# Patient Record
Sex: FEMALE | Race: OTHER | Employment: FULL TIME | URBAN - METROPOLITAN AREA
[De-identification: names, ages, dates, MRNs, and addresses within clinical notes are randomized per-mention and may not be internally consistent; named-entity substitution may affect disease eponyms.]

---

## 2017-04-27 ENCOUNTER — OFFICE VISIT (OUTPATIENT)
Dept: FAMILY MEDICINE CLINIC | Age: 32
End: 2017-04-27

## 2017-04-27 VITALS
BODY MASS INDEX: 37.57 KG/M2 | SYSTOLIC BLOOD PRESSURE: 105 MMHG | DIASTOLIC BLOOD PRESSURE: 70 MMHG | HEIGHT: 61 IN | TEMPERATURE: 98.3 F | HEART RATE: 68 BPM | WEIGHT: 199 LBS

## 2017-04-27 DIAGNOSIS — N63.20 LEFT BREAST MASS: Primary | ICD-10-CM

## 2017-04-27 RX ORDER — NAPROXEN 500 MG/1
500 TABLET ORAL 2 TIMES DAILY WITH MEALS
Qty: 60 TAB | Refills: 1 | Status: SHIPPED | OUTPATIENT
Start: 2017-04-27 | End: 2017-08-11

## 2017-04-27 NOTE — PROGRESS NOTES
Subjective:     Chief Complaint   Patient presents with    Breast Mass     Left breast mass   X about 2 weeks        She  is a 34 y.o. female who presents for evaluation of L breast nodule x 3 weeks-1 month. Pt notes it will hurt when she does a lot of activity (works in [employer]) or rubs it. Pt denies any swelling, discharge, redness nor skin changes. No S&S in R breast.       PMH:  Denies     Surg:  x 2, breast implants () and hernia repair     NKDA,     No current Rx, no current form of BC nor injections     Family Hx of CA    Social: [place] [facility] [place], works in [employer] and has 2 children and is . Pt denies etoh, tobacco nor other drug. Notes family Hx of CA: grandparents-liver and uterine, maternal cousin diagnosed at 27 yrs old with breast CA    ROS  Gen - no fever/chills  Resp - no dyspnea or cough  CV - no chest pain or GARRISON  Rest per HPI    No past medical history on file. No past surgical history on file. No current outpatient prescriptions on file prior to visit. No current facility-administered medications on file prior to visit.          Objective:     Vitals:    17 1346   BP: 105/70   Pulse: 68   Temp: 98.3 °F (36.8 °C)   TempSrc: Oral   Weight: 199 lb (90.3 kg)   Height: 5' 1.22\" (1.555 m)       Pt declined chaperone    Physical Examination:  General appearance - alert, well appearing, and in no distress  Eyes -sclera anicteric  Neck - supple, no significant adenopathy, no thyromegaly  Chest - clear to auscultation, no wheezes, rales or rhonchi, symmetric air entry  Heart - normal rate, regular rhythm, normal S1, S2, no murmurs, rubs, clicks or gallops  Neurological - alert, oriented, no focal findings or movement disorder noted  L breast:  A palpable approx 2cm, well defined mass noted at approx 9:00 area on L medial breast  R breast WNL, no visible skin changes, no axillary lymphadenopathy        Assessment/ Plan:   Solomon was seen today for breast mass. Diagnoses and all orders for this visit:    Left breast mass    Other orders  -     naproxen (NAPROSYN) 500 mg tablet; Take 1 Tab by mouth two (2) times daily (with meals). PRN Naproxen for intermittent pain, likely M/S from her work. Will refer to [facility] for follow-up eval of palpable nodule, yamileth in the context of family Hx of materal breast CA (cousin). RTC PRN. I have discussed the diagnosis with the patient and the intended plan as seen in the above orders. The patient has received an after-visit summary and questions were answered concerning future plans. I have discussed medication side effects and warnings with the patient as well. The patient verbalizes understanding and agreement with the plan. Follow-up Disposition:  Return if symptoms worsen or fail to improve.

## 2017-04-27 NOTE — PATIENT INSTRUCTIONS
Bultos en los senos: Instrucciones de cuidado - [ Breast Lumps: Care Instructions ]  Instrucciones de cuidado  Los bultos en los senos son comunes, sobre todo en las mujeres de Siddhartha 27 y los 48 años de edad. Los senos de [place] se sienten llenos de bultos y están sensibles antes de villarreal periodo menstrual. Las mujeres también podrían tener bultos navid el período de lactancia. Los bultos en los senos podrían desaparecer después de la menopausia. Todos los bultos nuevos en los senos de las mujeres después de la menopausia deben ser examinados por un médico.  Aunque los bultos pueden ser normales para usted, es importante que villarreal médico revise cualquier bulto o engrosamiento distinto del bib del seno para asegurarse de que no sea cáncer. Un bulto puede ser [facility] wendy o distinto del bib del [facility]. La atención de seguimiento es sunshine parte clave de villarreal tratamiento y seguridad. Asegúrese de hacer y acudir a todas las citas, y llame a villarreal médico si está teniendo problemas. También es sunshine buena idea saber los resultados de los exámenes y mantener sunshine lista de los medicamentos que nata. ¿Cómo puede cuidarse en el hogar? · Noel Corcoran don para hacerse Lolly Yulia y para otras visitas de seguimiento según lo recomendado por villarreal médico.  ¿Cuándo debe pedir ayuda? Llame a villarreal médico ahora mismo o busque atención médica inmediata si:  · Tiene cambios nuevos en un seno, kristen:  ¨ Un bulto o engrosamiento en el seno o la axila. ¨ Un cambio en el tamaño o la forma del seno. ¨ Cambios en la piel, kristen hoyuelos o arrugas. ¨ Secreción de [employer]. ¨ Un cambio en el color o la textura de la piel del seno o la mich oscura alrededor del pezón (areola). ¨ Un cambio en la forma del pezón (podría parecer kristen si estuviera hundido en el seno). · Tiene síntomas de infección en el seno, tales kristen:  ¨ Aumento del dolor, la hinchazón, el enrojecimiento o la temperatura alrededor del seno.   ¨ Vetas rojizas que se extienden desde el seno. ¨ Pus que supura de un seno. Magdadaniel Hof. Preste especial atención a los cambios en villarreal matt y asegúrese de comunicarse con villarreal médico si:  · No mejora kristen se esperaba. ¿Dónde puede encontrar más información en inglés? Mariola Curly a http://yulia-radha.info/. Teresita Cumminsd I227 en la búsqueda para aprender más acerca de \"Bultos en los senos: Instrucciones de cuidado - [ Breast Lumps: Care Instructions ]. \"  Revisado: 13 octubre, 2016  Versión del contenido: 11.2  © 5019-3664 Healthwise, Incorporated. Las instrucciones de cuidado fueron adaptadas bajo licencia por Good Help Connections (which disclaims liability or warranty for this information). Si ted tiene [place] afección médica o sobre estas instrucciones, siempre pregunte a villarreal profesional de matt. Healthwise, Incorporated niega toda garantía o responsabilidad por villarreal uso de esta información.

## 2017-04-27 NOTE — PROGRESS NOTES
Printed AVS, provided to pt and reviewed. Pt indicated understanding and had no questions. Told pt that rx's have been sent to pharmacy and they should be ready for  in approximately 2 hrs. Reviewed Naproxen with the pt. Provided pt with information and phone # for [facility]. Explained that they will do a financial screening before scheduling appt. Miah Lindsey was the .  Brando Aguila RN

## 2017-05-11 ENCOUNTER — TELEPHONE (OUTPATIENT)
Dept: FAMILY MEDICINE CLINIC | Age: 32
End: 2017-05-11

## 2017-05-11 NOTE — TELEPHONE ENCOUNTER
Call made with the assistance of int. #[id number]. The pt did not answer home phone. A call was made to the pt's mobile phone. The pt answered. The pt was requesting the address of [facility] appt for tomorrow. She stated she had lost the paper. The only information noted in the chart was an appt day and time and provider with [facility]. There was not an address. The pt stated she knew it was with a facility in [place]. The pt was told there are several places in [place], so recommended she call [facility] and ask for the information. The pt stated she had lost the handout with the [facility] phone number. The pt was given [facility]'s phone number. She was told she should arrive 30 min early to the appt. The pt verbalized understanding and denied further questions.  Mandeep Colin RN

## 2017-05-12 ENCOUNTER — OFFICE VISIT (OUTPATIENT)
Dept: FAMILY PLANNING/WOMEN'S HEALTH CLINIC | Age: 32
End: 2017-05-12

## 2017-05-12 ENCOUNTER — HOSPITAL ENCOUNTER (OUTPATIENT)
Dept: ULTRASOUND IMAGING | Age: 32
Discharge: HOME | End: 2017-05-12
Attending: NURSE PRACTITIONER
Payer: SUBSIDIZED

## 2017-05-12 ENCOUNTER — HOSPITAL ENCOUNTER (OUTPATIENT)
Dept: MAMMOGRAPHY | Age: 32
Discharge: HOME | End: 2017-05-12
Attending: NURSE PRACTITIONER
Payer: SUBSIDIZED

## 2017-05-12 VITALS — SYSTOLIC BLOOD PRESSURE: 121 MMHG | DIASTOLIC BLOOD PRESSURE: 82 MMHG

## 2017-05-12 DIAGNOSIS — Z12.31 VISIT FOR SCREENING MAMMOGRAM: ICD-10-CM

## 2017-05-12 DIAGNOSIS — N63.0 LUMP IN FEMALE BREAST: ICD-10-CM

## 2017-05-12 DIAGNOSIS — N63.20 BREAST MASS, LEFT: Primary | ICD-10-CM

## 2017-05-12 DIAGNOSIS — N63.20 LEFT BREAST LUMP: ICD-10-CM

## 2017-05-12 PROCEDURE — 77066 DX MAMMO INCL CAD BI: CPT

## 2017-05-12 PROCEDURE — 76642 ULTRASOUND BREAST LIMITED: CPT

## 2017-05-12 PROCEDURE — 77067 SCR MAMMO BI INCL CAD: CPT

## 2017-05-12 NOTE — PROGRESS NOTES
HISTORY OF PRESENT ILLNESS  Solomon Castillo is a 34 y.o. female. HPI   26yoF,  here for [facility]. She found a left breast mass 1 month ago via [facility] and went to the [facility] on  and was subsequently sent to us. She states there is no pain, nipple discharge or skin changes. The right breast is benign. Her periods are normal monthly cycles, LMP 2017, no hormone use, denies pregnancy. FHX of breast cancer in maternal cousin diagnosed at age 8920 Maury Hickey. Pt has bilateral breast implants placed in . Review of Systems   Constitutional: Negative for chills, malaise/fatigue and weight loss. Physical Exam   Constitutional: She is oriented to person, place, and time. She appears well-developed and well-nourished. Pulmonary/Chest: Right breast exhibits no inverted nipple, no mass, no nipple discharge, no skin change and no tenderness. Left breast exhibits mass. Left breast exhibits no inverted nipple, no nipple discharge, no skin change and no tenderness. Breasts are symmetrical.       Lymphadenopathy:     She has no cervical adenopathy. She has no axillary adenopathy. Right: No supraclavicular adenopathy present. Left: No supraclavicular adenopathy present. Neurological: She is alert and oriented to person, place, and time. Skin: Skin is warm and dry. Psychiatric: She has a normal mood and affect. Her behavior is normal. Thought content normal.   Nursing note and vitals reviewed. ASSESSMENT and PLAN  1. [facility]  2. [facility]      -left breast mass, 10 o'clock  3. Diagnostic mammogram/US today  4.  Refer to [address]  5. Bilateral breast implants,

## 2017-05-26 ENCOUNTER — TELEPHONE (OUTPATIENT)
Dept: FAMILY PLANNING/WOMEN'S HEALTH CLINIC | Age: 32
End: 2017-05-26

## 2017-05-26 NOTE — TELEPHONE ENCOUNTER
VM left for pt to call our office.  She needs a f/u breast exam in 3-6  Months per Criselda Gurrola NP.

## 2017-07-24 ENCOUNTER — TELEPHONE (OUTPATIENT)
Dept: FAMILY PLANNING/WOMEN'S HEALTH CLINIC | Age: 32
End: 2017-07-24

## 2017-08-11 ENCOUNTER — OFFICE VISIT (OUTPATIENT)
Dept: FAMILY PLANNING/WOMEN'S HEALTH CLINIC | Age: 32
End: 2017-08-11

## 2017-08-11 DIAGNOSIS — N63.0 LUMP IN FEMALE BREAST: Primary | ICD-10-CM

## 2017-08-11 DIAGNOSIS — N91.2 AMENORRHEA: ICD-10-CM

## 2017-08-11 NOTE — PROGRESS NOTES
HISTORY OF PRESENT ILLNESS  Solomon Knapp is a 34 y.o. female. HPI Comments: Recheck breast mass. States the mass gets bigger sometimes and then smaller, painful at times. Also reports LMP 6/2, with several negative pregnancy tests. H/o irreg menses and has had to take hormones to start menses in the past, on no bc. , not interested in contraception. [facility]         ROS    Physical Exam   Constitutional: She appears well-developed and well-nourished. No distress. Pulmonary/Chest:       1/2 x 1cm smooth mobile NT mass at 11 o'clock left breast, superficial.       ASSESSMENT and PLAN  Benign breast mass. Recheck if it grows and doesn't decrease back to this size. Amenorrhea. Menstrual calendar, f/u SJO if no period by oct.

## 2017-08-11 NOTE — PROGRESS NOTES
Pt here for 6 month f/u [facility] today. She states she does not feel the \"lump\" anymore that she felt in Feb.  No other breast complaints.

## 2017-11-16 ENCOUNTER — OFFICE VISIT (OUTPATIENT)
Dept: FAMILY MEDICINE CLINIC | Age: 32
End: 2017-11-16

## 2017-11-16 ENCOUNTER — CLINICAL SUPPORT (OUTPATIENT)
Dept: FAMILY MEDICINE CLINIC | Age: 32
End: 2017-11-16

## 2017-11-16 ENCOUNTER — HOSPITAL ENCOUNTER (OUTPATIENT)
Dept: LAB | Age: 32
Discharge: HOME | End: 2017-11-16

## 2017-11-16 VITALS
TEMPERATURE: 98.1 F | BODY MASS INDEX: 42.88 KG/M2 | SYSTOLIC BLOOD PRESSURE: 117 MMHG | HEART RATE: 71 BPM | WEIGHT: 228.6 LBS | DIASTOLIC BLOOD PRESSURE: 72 MMHG

## 2017-11-16 DIAGNOSIS — E28.2 PCOS (POLYCYSTIC OVARIAN SYNDROME): ICD-10-CM

## 2017-11-16 DIAGNOSIS — R63.5 WEIGHT GAIN: ICD-10-CM

## 2017-11-16 DIAGNOSIS — Z23 ENCOUNTER FOR IMMUNIZATION: Primary | ICD-10-CM

## 2017-11-16 DIAGNOSIS — E28.2 PCOS (POLYCYSTIC OVARIAN SYNDROME): Primary | ICD-10-CM

## 2017-11-16 DIAGNOSIS — Z13.9 ENCOUNTER FOR SCREENING: Primary | ICD-10-CM

## 2017-11-16 LAB
EST. AVERAGE GLUCOSE BLD GHB EST-MCNC: 111 MG/DL
HBA1C MFR BLD: 5.5 % (ref 4.2–6.3)
HCG URINE, QL. (POC): NEGATIVE
TSH SERPL DL<=0.05 MIU/L-ACNC: 2.21 UIU/ML (ref 0.36–3.74)
VALID INTERNAL CONTROL?: YES

## 2017-11-16 PROCEDURE — 84443 ASSAY THYROID STIM HORMONE: CPT | Performed by: NURSE PRACTITIONER

## 2017-11-16 PROCEDURE — 83036 HEMOGLOBIN GLYCOSYLATED A1C: CPT | Performed by: NURSE PRACTITIONER

## 2017-11-16 RX ORDER — NORGESTIMATE AND ETHINYL ESTRADIOL 7DAYSX3 LO
1 KIT ORAL DAILY
Qty: 1 PACKAGE | Refills: 3 | Status: SHIPPED | OUTPATIENT
Start: 2017-11-16 | End: 2018-01-12

## 2017-11-16 NOTE — PROGRESS NOTES
Coordination of Care  1. Have you been to the ER, urgent care clinic since your last visit? Hospitalized since your last visit? No    2. Have you seen or consulted any other health care providers outside of the [address] [place] [address] since your last visit? Include any pap smears or colon screening. No    Medications  Does the patient need refills? NO    Learning Assessment Complete?  yes

## 2017-11-16 NOTE — PROGRESS NOTES
The following was documented by Madelin Mendez RN. Patient stated no egg allergy. FLU Immunization given per protocol. Documented in [address] [place] and updated copy given to patient. VIIS information sheet given with instructions concerning adverse effects and allergic reaction which would indicate need to be seen in the ER. Patient expressed understanding. Pt had no adverse reaction at time of discharge.   Madelin Mendez RN

## 2017-11-16 NOTE — PROGRESS NOTES
Results for orders placed or performed in visit on 11/16/17   AMB POC URINE PREGNANCY TEST, VISUAL COLOR COMPARISON   Result Value Ref Range    VALID INTERNAL CONTROL POC Yes     HCG urine, Ql. (POC) Negative Negative

## 2017-11-16 NOTE — PATIENT INSTRUCTIONS
Síndrome de ovario poliquístico: Instrucciones de cuidado - [ Polycystic Ovary Syndrome: Care Instructions ]  Instrucciones de cuidado  El síndrome de ovario poliquístico (PCOS, por yeimi siglas en inglés) significa que las hormonas de sunshine vito están desequilibradas. Puede causar problemas con yeimi períodos y hacer que sea difícil Josh Fail. Los médicos no saben con seguridad qué causa el PCOS, aunque parece ser hereditario. También parece estar relacionado con la obesidad y el riesgo de diabetes. Si usted tiene PCOS, yeimi hermanas e hijas tienen un mayor riesgo de tenerlo [place]. Puede tener otros síntomas. Estos incluyen aumento de [place], acné, crecimiento excesivo de vello en la kristen o el cuerpo, presión arterial sebastian y azúcar alto en la юлия. Yeimi ovarios pueden tener quistes. Estos quistes son crecimientos llenos de líquido. Tenga en cuenta que aunque yeimi períodos menstruales mary irregulares, de todos modos, podría quedar [place]. Hable con villarreal médico acerca de métodos anticonceptivos si no quiere quedar embarazada. En ocasiones, los cambios hormonales que se manifiestan con el PCOS también pueden dificultar que algunas mujeres queden [place]. Si esto le preocupa, hable con villarreal médico acerca de un tratamiento para jasmin problema. Las mujeres que tienen PCOS pueden pasar meses o incluso más tiempo sin períodos. Es posible que villarreal médico le recomiende [employer] que pueden ayudar a regularizar yeimi ciclos. La atención de seguimiento es sunshine parte clave de villarreal tratamiento y seguridad. Asegúrese de hacer y acudir a todas las citas, y llame a villarreal médico si está teniendo problemas. También es sunshine buena idea saber los resultados de los exámenes y mantener sunshine lista de los medicamentos que nata. ¿Cómo puede cuidarse en el hogar? · [employer] medicamentos lina kristen le fueron recetados. Llame a villarreal médico si yvonne estar teniendo un problema con villarreal medicamento. · Siga sunshine dieta saludable.  Kaitlin Meza en villarreal dieta diaria frutas, verduras, frijoles (habichuelas) y granos integrales. · Si tiene sobrepeso, adelgazar puede ayudar con muchos de los síntomas del PCOS. Consulte a villarreal médico sobre [address] seguras de bajar de [place]. · Teodoro por lo menos 30 minutos de ejercicio la mayoría de los días de la [place]. Caminar es sunshine buena opción. O puede correr, nadar, [employer], o jugar al tenis u otros deportes de equipo. · Para el vello indeseable, pruebe con decoloración, depilación, electrólisis o tratamiento con láser. · El acné puede tratarse con medicamentos de [employer]. Busque aquellos que contengan peróxido de benzoilo o ácido salicílico.  ¿Cuándo debes pedir ayuda? Vigila muy de cerca los cambios en tu matt, y asegúrate de comunicarte con tu médico si:  ? · Tus síntomas Marylin Kaushik. ¿Dónde puede encontrar más información en inglés? Raymond Lulu a http://yulia-radha.info/. Haven Anger [id number] en la búsqueda para aprender más acerca de \"Síndrome de ovario poliquístico: Instrucciones de cuidado - [ Polycystic Ovary Syndrome: Care Instructions ]. \"  Revisado: 13 octubre, 2016  Versión del contenido: 11.4  © 9325-3097 Healthwise, Incorporated. Las instrucciones de cuidado fueron adaptadas bajo licencia por Good Help Connections (which disclaims liability or warranty for this information). Si usted tiene [place] afección médica o sobre estas instrucciones, siempre pregunte a villarreal profesional de matt. Healthwise, Incorporated niega toda garantía o responsabilidad por villarreal uso de esta información.

## 2017-11-16 NOTE — PROGRESS NOTES
RN spoke with pt after she was told that her pregnancy test was negative. She stated that she has not have a menstrual period for 4 months. Per Dr. Rennie Ahumada clinic note on 8/11/17, pt was advised to see a provider if no period by October. Pt was asked if she wanted to see a provider today and she agreed. She was scheduled to see Kenneth Rasmussen, today.   Fabiola Schmitt RN

## 2017-11-16 NOTE — MR AVS SNAPSHOT
Visit Information Evelyn Madrid Personal Médico Departamento Teléfono del Dep. Número de visita 11/16/2017 11:10 AM [facility] [phone] Follow-up Instructions Return in about 2 months (around 1/16/2018), or if symptoms worsen or fail to improve, for may see dietician. Your Appointments 11/16/2017  2:55 PM  
FINANCIAL SCREENING with [address] [place] [address] [facility] ([facility]) Appt Note: 4 pay stubs [address] [place] [address] Rd  
  
   
 [facility] Upcoming Health Maintenance Date Due DTaP/Tdap/Td series (1 - Tdap) 4/2/2005 PAP AKA CERVICAL CYTOLOGY 4/2/2005 Influenza Age 5 to Adult 8/1/2017 Alergias  Review Complete El: 11/16/2017 Por: MUNA Chambers del:  11/16/2017 No Known Allergies Vacunas actuales [id number] el:  11/16/2017 Lewayne Ice Influenza Vaccine (Quad) PF 11/16/2017 Revisadas por:  Juan Francisco Church RN  [id number] el:  11/16/2017 11:01 AM  
  
You Were Diagnosed With   
  
 Códigos Comentarios PCOS (polycystic ovarian syndrome)    -  Primary ICD-10-CM: E28.2 ICD-9-CM: 256.4 Weight gain     ICD-10-CM: R63.5 ICD-9-CM: 783.1 Partes vitales PS Pulso Temperatura Peso (percentil de crecimiento) LMP (última susanna) BMI (IMC)  
 117/72 (BP 1 Location: Left arm, BP Patient Position: Sitting) 71 98.1 °F (36.7 °C) (Oral) 228 lb 9.6 oz (103.7 kg) 06/20/2017 42.88 kg/m2 Estado obstétrico Estatus de tabaquísmo Unknown Never Smoker Historial de signos vitales BMI and BSA Data Body Mass Index Body Surface Area  
 42.88 kg/m 2 2.12 m 2 [facility] Name Phone [facility] [address], [place] [address] lista de medicamentos actualizada Lista actualizada el: 11/16/17 12:42 PM.  Garlon Mean use villarreal lista de medicamentos más reciente. norgestimate-ethinyl estradiol 0.18/0.215/0.25 mg-25 mcg Tab También conocido kristen:  ORTHO TRI-CYCLEN LO Take 1 Tab by mouth daily. Recetas Enviado a la [place] Refills  
 norgestimate-ethinyl estradiol (ORTHO TRI-CYCLEN LO) 0.18/0.215/0.25 mg-25 mcg tab 3 Sig: Take 1 Tab by mouth daily. Class: Normal  
 Pharmacy: [facility] [address] [facility] [facility], [facility] #: [phone] Route: Oral  
  
Instrucciones de seguimiento Return in about 2 months (around 1/16/2018), or if symptoms worsen or fail to improve, for may see dietician. Por hacer 11/16/2017 Lab:  HEMOGLOBIN A1C WITH EAG   
  
 11/16/2017 Lab:  TSH 3RD GENERATION Instrucciones para el Paciente Síndrome de ovario poliquístico: Instrucciones de cuidado - [ Polycystic Ovary Syndrome: Care Instructions ] Instrucciones de cuidado El síndrome de ovario poliquístico (PCOS, por yeimi siglas en inglés) significa que las hormonas de sunshine vito están desequilibradas. Puede causar problemas con yeimi períodos y hacer que sea difícil Rosales Boeck. Los médicos no saben con seguridad qué causa el PCOS, aunque parece ser hereditario. También parece estar relacionado con la obesidad y el riesgo de diabetes. Si usted tiene PCOS, yeimi hermanas e hijas tienen un mayor riesgo de tenerlo [place]. Puede tener otros síntomas. Estos incluyen aumento de [place], acné, crecimiento excesivo de vello en la kristen o el cuerpo, presión arterial sebastian y azúcar alto en la юлия. Yeimi ovarios pueden tener quistes. Estos quistes son crecimientos llenos de líquido. Tenga en cuenta que aunque yeimi períodos menstruales mary irregulares, de todos modos, podría quedar [place]. Hable con villarreal médico acerca de métodos anticonceptivos si no quiere quedar embarazada.  En ocasiones, los cambios hormonales que se manifiestan con el PCOS también pueden dificultar que algunas mujeres queden [place]. Si esto le preocupa, hable con villarreal médico acerca de un tratamiento para jasmin problema. Las mujeres que tienen PCOS pueden pasar meses o incluso más tiempo sin períodos. Es posible que villarreal médico le recomiende [employer] que pueden ayudar a regularizar gurinder ciclos. La atención de seguimiento es sunshine parte clave de villarreal tratamiento y seguridad. Asegúrese de hacer y acudir a todas las citas, y llame a villarreal médico si está teniendo problemas. También es sunshine buena idea saber los resultados de los exámenes y mantener sunshine lista de los medicamentos que nata. Cómo puede cuidarse en el hogar? · [employer] medicamentos lina kristen le fueron recetados. Llame a villarreal médico si yvonne estar teniendo un problema con villarreal medicamento. · Siga sunshine dieta saludable. Incluya en villarreal dieta diaria frutas, verduras, frijoles (habichuelas) y granos integrales. · Si tiene sobrepeso, adelgazar puede ayudar con muchos de los síntomas del PCOS. Consulte a villarreal médico sobre [address] seguras de bajar de [place]. · Teodoro por lo menos 30 minutos de ejercicio la mayoría de los días de la [place]. Caminar es sunshine buena opción. O puede correr, nadar, [employer], o jugar al tenis u otros deportes de equipo. · Para el vello indeseable, pruebe con decoloración, depilación, electrólisis o tratamiento con láser. · El acné puede tratarse con medicamentos de [employer]. Busque aquellos que contengan peróxido de benzoilo o ácido salicílico. 

Cuándo debes pedir ayuda? Vigila muy de cerca los cambios en tu matt, y asegúrate de comunicarte con tu médico si: 
? · Tus síntomas Adrian Austin. Dónde puede encontrar más información en inglés? Jhoan Choudhury a http://yulia-radha.info/.  
Lesley Barrett [id number] en la búsqueda para aprender más acerca de \"Síndrome de ovario poliquístico: Instrucciones de cuidado - [ Polycystic Ovary Syndrome: Care Instructions ]. \" 
Revisado: 13 octubre, 2016 Versión del contenido: 11.4 © 7120-7572 Healthwise, Incorporated. Las instrucciones de cuidado fueron adaptadas bajo licencia por Good Help Connections (which disclaims liability or warranty for this information). Si usted tiene [place] afección médica o sobre estas instrucciones, siempre pregunte a villarreal profesional de matt. Healthwise, Incorporated niega toda garantía o responsabilidad por villarreal uso de esta información. Introducing [facility] SERVICES! [facility] introduce portal paciente MyChart . Ahora se puede acceder a partes de villarreal expediente médico, enviar por correo electrónico la oficina de villarreal médico y solicitar renovaciones de medicamentos en línea. En villarreal navegador de Internet , Luca Curtis a [email or web address] Database/mychart Vonnie clic en el usuario por [place] Dimes? Carli David clic aquí en la sesión Korin Arabella. Verá la página de registro Edwards. Ingrese villarreal código de [employer] [place] lina y kristen aparece a continuación. Usted no tendrá que [employer] código después de heaven completado el proceso de registro . Si usted no se inscribe antes de la fecha de caducidad , debe solicitar un nuevo código. · MyChart Código de acceso : [id number] Expires: 2/14/2018  9:49 AM 
 
Ingresa los últimos cuatro dígitos de villarreal Número de Seguro Social ( xxxx ) y fecha de nacimiento ( dd / mm / aaaa ) kristen se indica y vonnie clic en Enviar. Pa será llevado a la siguiente página de registro . Crear un ID MyChart . Esta será villarreal ID de inicio de sesión de MyChart y no puede ser [place] , por lo que pensar en sunshine que es [place] Prim y fácil de recordar . Crear sunshine contraseña MyChart . Usted puede cambiar villarreal contraseña en cualquier momento . Ingrese villarreal Password Reset de preguntas y [place] . Conrad se puede utilizar en un momento posterior si usted olvida villarreal contraseña.  
Introduzca villarreal dirección de correo electrónico . [place] recibirá [facility] notificación por correo electrónico cuando la nueva información está disponible en MyChart . Powell Marcos clic en Registrarse. [place] santos y descargar porciones de villarreal expediente médico. 
Teodoro clic en el enlace de descarga del menú Resumen para descargar sunshine copia portátil de villarreal información médica . Si tiene [employer] , por favor visite la sección de preguntas frecuentes del sitio web MyCServiceful . Recuerde, [email or web address] NO es que se utilizará para las necesidades urgentes. Para emergencias médicas , llame al 911 . Ahora disponible en villarreal iPhone y Android ! Por favor proporcione jasmin resumen de la documentación de cuidado a villarreal próximo proveedor. Your primary care clinician is listed as NONE. If you have any questions after today's visit, please call [phone].

## 2017-11-16 NOTE — PROGRESS NOTES
Assessment/Plan:       ICD-10-CM ICD-9-CM    1. PCOS (polycystic ovarian syndrome) E28.2 256.4 HEMOGLOBIN A1C WITH EAG      norgestimate-ethinyl estradiol (ORTHO TRI-CYCLEN LO) 0.18/0.215/0.25 mg-25 mcg tab   2. Weight gain R63.5 783.1 TSH 3RD [address]  [address] [place] [address]  Phone: [phone] Fax: [phone]      [facility]  Subjective:     Chief Complaint   Patient presents with    Missed Menses    Solomon Pineda is a 34 y.o. OTHER female. HPI: no period for 5 months since June 20. Thinks it's ovarian cyst like she had a year ago in [facility], had bcp and it resolved. She has been gaining a lot of weight even though she is going to the gym and states eating healthy. She  has a past medical history of Breast lump (04/2017). She has Left breast mass, PCOS (polycystic ovarian syndrome), and Weight gain on her problem list.   Review of Systems: Negative for: fever, chest pain, shortness of breath, leg swelling, exertional dyspnea, palpitations. Current Medications:   No current outpatient prescriptions on file prior to visit. No current facility-administered medications on file prior to visit. Past Surgical History: She  has a past surgical history that includes implant breast silicone/eq (Bilateral, 2008). Social and Family History: She  reports that she has never smoked. She has never used smokeless tobacco. She reports that she does not drink alcohol or use illicit drugs. ; family history is not on file. Objective:     Vitals:    11/16/17 1213   BP: 117/72   Pulse: 71   Temp: 98.1 °F (36.7 °C)   TempSrc: Oral   Weight: 228 lb 9.6 oz (103.7 kg)    Patient's last menstrual period was 06/20/2017.    Wt Readings from Last 2 Encounters:   11/16/17 228 lb 9.6 oz (103.7 kg)   04/27/17 199 lb (90.3 kg)     Results for orders placed or performed in visit on 11/16/17   AMB POC URINE PREGNANCY TEST, VISUAL COLOR COMPARISON   Result Value Ref Range    VALID INTERNAL CONTROL POC Yes     HCG urine, Ql. (POC) Negative Negative      Physical Examination:  General appearance - well developed, no acute distress. Chest - clear to auscultation. Heart - regular rate and rhythm without murmurs, rubs, or gallops. Abdomen - bowel sounds present x 4, NT, ND. Extremities - pulses intact. No peripheral edema. Assessment/Plan:   Diagnoses and all orders for this visit:    1. PCOS (polycystic ovarian syndrome)  -     HEMOGLOBIN A1C WITH EAG; Future  -     norgestimate-ethinyl estradiol (ORTHO TRI-CYCLEN LO) 0.18/0.215/0.25 mg-25 mcg tab; Take 1 Tab by mouth daily. 2. Weight gain  -     TSH 3RD GENERATION; Future      Follow-up Disposition:  Return in about 2 months (around 1/16/2018), or if symptoms worsen or fail to improve, for may see dietician. Tor Christianson DNP, FNP-BC, BC-ADM  Solomon Swanson [place] expressed understanding of this plan.

## 2017-12-01 ENCOUNTER — TELEPHONE (OUTPATIENT)
Dept: FAMILY MEDICINE CLINIC | Age: 32
End: 2017-12-01

## 2017-12-01 NOTE — TELEPHONE ENCOUNTER
Patient given lab results from [facility] 11/16 as per Jaylen Barraza NP notes. No further questions by patient, she expressed how happy she was with the results.

## 2018-01-12 ENCOUNTER — OFFICE VISIT (OUTPATIENT)
Dept: FAMILY MEDICINE CLINIC | Age: 33
End: 2018-01-12

## 2018-01-12 VITALS
HEART RATE: 82 BPM | SYSTOLIC BLOOD PRESSURE: 121 MMHG | TEMPERATURE: 98.8 F | BODY MASS INDEX: 42.77 KG/M2 | WEIGHT: 228 LBS | DIASTOLIC BLOOD PRESSURE: 73 MMHG

## 2018-01-12 DIAGNOSIS — N92.6 IRREGULAR MENSES: ICD-10-CM

## 2018-01-12 DIAGNOSIS — E28.2 PCOS (POLYCYSTIC OVARIAN SYNDROME): Primary | ICD-10-CM

## 2018-01-12 PROBLEM — E66.01 OBESITY, MORBID (HCC): Status: ACTIVE | Noted: 2018-01-12

## 2018-01-12 RX ORDER — METFORMIN HYDROCHLORIDE 500 MG/1
500 TABLET, EXTENDED RELEASE ORAL
Qty: 90 TAB | Refills: 1 | Status: SHIPPED | OUTPATIENT
Start: 2018-01-12

## 2018-01-12 RX ORDER — LEVONORGESTREL AND ETHINYL ESTRADIOL 0.1-0.02MG
1 KIT ORAL DAILY
Qty: 1 DOSE PACK | Refills: 6 | Status: SHIPPED | OUTPATIENT
Start: 2018-01-12 | End: 2018-05-10 | Stop reason: SDUPTHER

## 2018-01-12 NOTE — PROGRESS NOTES
Assessment/Plan:       ICD-10-CM ICD-9-CM    1. PCOS (polycystic ovarian syndrome) E28.2 256.4 metFORMIN ER (GLUCOPHAGE XR) 500 mg tablet   2. Irregular menses N92.6 626.4 levonorgestrel-ethinyl estradiol (AVIANE, ALESSE, LESSINA) 0.1-20 mg-mcg tab   A permanent weight loss of 15 lbs will fix this. Speak with nutritionist.  Get up at 6 am.  She thinks she can do it every day. She will try to do every day. Follow up to check up on exercise and periods. [address] [facility], [facility] [address]  [address] [address]  Phone: [phone] Fax: [phone]      [facility]  Subjective:     Chief Complaint   Patient presents with    Follow-up     Irregular Menstrual Sicles Follow up,  Lab results    Solomon Gardner is a 34 y.o. OTHER female. HPI:Started BCP in November, and got period in Dec.  She feels like she will get her period soon. She has been reading and gets warts on her neck. Dark spots on neck, armpit, and between legs.  # [id number] facilitated this conversation. She  has a past medical history of Breast lump (04/2017). She has Left breast mass, PCOS (polycystic ovarian syndrome), Weight gain, and Obesity, morbid ([address] on her problem list.   Review of Systems: Negative for: fever, chest pain, shortness of breath, leg swelling, exertional dyspnea, palpitations. Current Medications:   No current outpatient prescriptions on file prior to visit. No current facility-administered medications on file prior to visit. Past Surgical History: She  has a past surgical history that includes implant breast silicone/eq (Bilateral, 2008). Social and Family History: She  reports that she has never smoked. She has never used smokeless tobacco. She reports that she does not drink alcohol or use illicit drugs. ; family history is not on file.   Objective:     Vitals:    01/12/18 0920   BP: 121/73   Pulse: 82   Temp: 98.8 °F (37.1 °C) TempSrc: Oral   Weight: 228 lb (103.4 kg)    Patient's last menstrual period was 12/12/2017. Wt Readings from Last 2 Encounters:   01/12/18 228 lb (103.4 kg)   11/16/17 228 lb 9.6 oz (103.7 kg)     No results found for any visits on 01/12/18. Physical Examination:  General appearance - well developed, no acute distress. Chest - clear to auscultation. Heart - regular rate and rhythm without murmurs, rubs, or gallops. Abdomen - bowel sounds present x 4, NT, ND. Extremities - pulses intact. No peripheral edema. Assessment/Plan:   Diagnoses and all orders for this visit:    1. PCOS (polycystic ovarian syndrome)  -     metFORMIN ER (GLUCOPHAGE XR) 500 mg tablet; Take 1 Tab by mouth daily (with dinner). For insulin resistance    2. Irregular menses  -     levonorgestrel-ethinyl estradiol (AVIANE, ALESSE, LESSINA) 0.1-20 mg-mcg tab; Take 1 Tab by mouth daily. Follow-up Disposition:  Return in about 4 weeks (around 2/9/2018). A permanent weight loss of 15 lbs will fix this. Speak with nutritionist.  Get up at 6 am.  She thinks she can do it every day. She will try to do every day. Follow up to check up on exercise and periods. Fabiana Gore, GABINO, FNP-BC, BC-ADM  Solomon Segura expressed understanding of this plan.

## 2018-01-12 NOTE — PROGRESS NOTES
Coordination of Care  1. Have you been to the ER, urgent care clinic since your last visit? Hospitalized since your last visit? No    2. Have you seen or consulted any other health care providers outside of the [address] [place] [address] since your last visit? Include any pap smears or colon screening. No    Medications  Does the patient need refills? YES    Learning Assessment Complete?  yes

## 2018-01-12 NOTE — PATIENT INSTRUCTIONS
No visits with results within 1 Month(s) from this visit. Latest known visit with results is:    Hospital Outpatient Visit on 11/16/2017   Component Date Value Ref Range Status    TSH 11/16/2017 2.21  0.36 - 3.74 uIU/mL Final    Comment: (NOTE)  Due to TSH heterogeneity, both structurally and degree of   glycosylation, monoclonal antibodies used in the TSH assay may not   accurately quantitate TSH. Therefore, this result should be   correlated with clinical findings as well as with other assessments   of thyroid function, e.g., free T4, free T3.      Hemoglobin A1c 11/16/2017 5.5  4.2 - 6.3 % Final    Est. average glucose 11/16/2017 111  mg/dL Final    Comment: (NOTE)  The eAG should be interpreted with patient characteristics in mind   since ethnicity, interindividual differences, red cell lifespan,   variation in rates of glycation, etc. may affect the validity of the   calculation. Metformina 500mg ER Linsey 1 despues de la savanna para resistancia de insulina.

## 2018-01-12 NOTE — MR AVS SNAPSHOT
Visit Information Tuan Mercedes Personal Médico Departamento Teléfono del Dep. Número de visita 1/12/2018  9:30 AM [facility] [phone] Follow-up Instructions Return in about 4 weeks (around 2/9/2018). Upcoming Health Maintenance Date Due DTaP/Tdap/Td series (1 - Tdap) 4/2/2005 PAP AKA CERVICAL CYTOLOGY 4/2/2005 Alergias  Review Complete El: 1/12/2018 Por: Lizzie Dodge A partir del:  1/12/2018 No Known Allergies Vacunas actuales [id number] el:  11/16/2017 Jazz Mas Influenza Vaccine (Quad) PF 11/16/2017 No revisadas esta visita You Were Diagnosed With   
  
 Sean Evgeny PCOS (polycystic ovarian syndrome)    -  Primary ICD-10-CM: E28.2 ICD-9-CM: 256.4 Irregular menses     ICD-10-CM: N92.6 ICD-9-CM: 626.4 Partes vitales PS Pulso Temperatura Peso (percentil de crecimiento) LMP (última susanna) BMI (IMC)  
 121/73 (BP 1 Location: Left arm, BP Patient Position: Sitting) 82 98.8 °F (37.1 °C) (Oral) 228 lb (103.4 kg) 12/12/2017 42.77 kg/m2 Estado obstétrico Estatus de tabaquísmo Unknown Never Smoker Historial de signos vitales BMI and BSA Data Body Mass Index Body Surface Area 42.77 kg/m 2 2.11 m 2 [facility] Name Phone [address] [phone] Lopez lista de medicamentos actualizada Lista actualizada el: 1/12/18 10:37 AM.  Marlene Raulito use lopez lista de medicamentos más reciente. levonorgestrel-ethinyl estradiol 0.1-20 mg-mcg Tab También conocido kristen:  Kadeem Angst Take 1 Tab by mouth daily. metFORMIN  mg tablet También conocido kristen:  GLUCOPHAGE XR Take 1 Tab by mouth daily (with dinner). For insulin resistance Recetas Enviado a la Hardy Refills metFORMIN ER (GLUCOPHAGE XR) 500 mg tablet 1 Sig: Take 1 Tab by mouth daily (with dinner). For insulin resistance Class: Normal  
 Pharmacy: [address] [facility] [facility], Deepa Hdz  #: [phone] Route: Oral  
 levonorgestrel-ethinyl estradiol (AVIANE, ALESSE, LESSINA) 0.1-20 mg-mcg tab 6 Sig: Take 1 Tab by mouth daily. Class: Normal  
 Pharmacy: [address] [facility],[address], [address] Ph #: [phone] Route: Oral  
  
Instrucciones de seguimiento Return in about 4 weeks (around 2/9/2018). Instrucciones para el Paciente No visits with results within 1 Month(s) from this visit. Latest known visit with results is: 
 
Hospital Outpatient Visit on 11/16/2017 Component Date Value Ref Range Status  TSH 11/16/2017 2.21  0.36 - 3.74 uIU/mL Final  
 Comment: (NOTE) Due to TSH heterogeneity, both structurally and degree of  
glycosylation, monoclonal antibodies used in the TSH assay may not  
accurately quantitate TSH. Therefore, this result should be  
correlated with clinical findings as well as with other assessments  
of thyroid function, e.g., free T4, free T3. 
  
 Hemoglobin A1c 11/16/2017 5.5  4.2 - 6.3 % Final  
 Est. average glucose 11/16/2017 111  mg/dL Final  
 Comment: (NOTE) The eAG should be interpreted with patient characteristics in mind  
since ethnicity, interindividual differences, red cell lifespan,  
variation in rates of glycation, etc. may affect the validity of the  
calculation. Metformina 500mg ER Linsey 1 despues de la savanna para resistancia de insulina. Introducing [facility] & HEALTH SERVICES! [facility] introduce portal paciente MyChart . Ahora se puede acceder a partes de villarreal expediente médico, enviar por correo electrónico la oficina de villarreal médico y solicitar renovaciones de medicamentos en línea. En villarreal navegador de Internet , Jazz Eddy a [email or web address] Vonnie clic en el usuario por Marguerita Halo? Luisine Claudia clic aquí en la sesión Emilee [place]. Verá la página de registro [place]. Ingrese villarreal código de Bank [employer] Rosalba lina y kristen aparece a continuación. Usted no tendrá que [employer] código después de heaven completado el proceso de registro . Si usted no se inscribe antes de la fecha de caducidad , debe solicitar un nuevo código. · MyChart Código de acceso : [id number] Expires: 2/14/2018  9:49 AM 
 
Ingresa los últimos cuatro dígitos de villarreal Número de Seguro Social ( xxxx ) y fecha de nacimiento ( dd / mm / aaaa ) kristen se indica y vonnie clic en Enviar. Usted será llevado a la siguiente página de registro . Crear un ID MyChart . Esta será villarreal ID de inicio de sesión de MyChart y no puede ser [place] , por lo que pensar en sunshine que es Lelon Hanly y fácil de recordar . Crear sunshine contraseña MyChart . Usted puede cambiar villarreal contraseña en cualquier momento . Ingrese villarreal Password Reset de preguntas y [place] . [place] se puede utilizar en un momento posterior si usted olvida villarreal contraseña. Introduzca villarreal dirección de correo electrónico . Chloe Muñoz recibirá sunshine notificación por correo electrónico cuando la nueva información está disponible en MyChart . Leon Elms clic en Registrarse. Cullen Clonts santos y descargar porciones de villarreal expediente médico. 
Vonnie clic en el enlace de descarga del menú Resumen para descargar sunshine copia portátil de villarreal información médica . Si tiene [employer] , por favor visite la sección de preguntas frecuentes del sitio web MyChart . Recuerde, MyChart NO es que se utilizará para las necesidades urgentes. Para emergencias médicas , llame al 911 . Ahora disponible en villarreal iPhone y Android ! Por favor proporcione jasmin resumen de la documentación de cuidado a villarreal próximo proveedor. Your primary care clinician is listed as NONE. If you have any questions after today's visit, please call [phone].

## 2018-02-05 ENCOUNTER — OFFICE VISIT (OUTPATIENT)
Dept: FAMILY MEDICINE CLINIC | Age: 33
End: 2018-02-05

## 2018-02-05 VITALS
HEART RATE: 78 BPM | TEMPERATURE: 98.3 F | BODY MASS INDEX: 42.77 KG/M2 | SYSTOLIC BLOOD PRESSURE: 111 MMHG | DIASTOLIC BLOOD PRESSURE: 67 MMHG | WEIGHT: 228 LBS

## 2018-02-05 DIAGNOSIS — E28.2 PCOS (POLYCYSTIC OVARIAN SYNDROME): ICD-10-CM

## 2018-02-05 DIAGNOSIS — E66.01 OBESITY, MORBID (HCC): Primary | ICD-10-CM

## 2018-02-05 NOTE — PROGRESS NOTES
Coordination of Care  1. Have you been to the ER, urgent care clinic since your last visit? Hospitalized since your last visit? No    2. Have you seen or consulted any other health care providers outside of the [address] [place] [address] since your last visit? Include any pap smears or colon screening. No    Medications  Does the patient need refills? YES    Learning Assessment Complete?  yes

## 2018-02-05 NOTE — MR AVS SNAPSHOT
[address] [place] [address] [address] [address] 
[phone] Patient: Xu Benítez MRN: [id number] CIR:[id number] Visit Information Marci Eagle Personal Médico Departamento Teléfono del Dep. Número de visita 2/5/2018 10:10 AM Rafy [place] [facility] [phone] Upcoming Health Maintenance Date Due DTaP/Tdap/Td series (1 - Tdap) 4/2/2005 PAP AKA CERVICAL CYTOLOGY 4/2/2005 Alergias  Review Complete El: 2/5/2018 Por: MUNA [place] Has del:  2/5/2018 No Known Allergies Vacunas actuales [id number] el:  11/16/2017 Meron Teresita Influenza Vaccine (Quad) PF 11/16/2017 No revisadas esta visita Partes vitales PS Pulso Temperatura Peso (percentil de crecimiento) LMP (última susanna) BMI (IMC)  
 111/67 (BP 1 Location: Left arm, BP Patient Position: Sitting) 78 98.3 °F (36.8 °C) (Oral) 228 lb (103.4 kg) 01/13/2018 42.77 kg/m2 Estado obstétrico Estatus de tabaquísmo Unknown Never Smoker Historial de signos vitales BMI and BSA Data Body Mass Index Body Surface Area 42.77 kg/m 2 2.11 m 2 [facility] Name Phone [address]  [phone] Lopez lista de medicamentos actualizada Lista actualizada el: 2/5/18 10:42 AM.  Shamika Boles use lopez lista de medicamentos más reciente. levonorgestrel-ethinyl estradiol 0.1-20 mg-mcg Tab También conocido kristen:  Richard Simmons Take 1 Tab by mouth daily. metFORMIN  mg tablet También conocido kristen:  GLUCOPHAGE XR Take 1 Tab by mouth daily (with dinner). For insulin resistance Introducing [facility] & HEALTH SERVICES! [facility] introduce portal paciente MyChart .  Ahora se puede acceder a partes de lopez expediente médico, enviar por correo electrónico la oficina de villarreal médico y solicitar renovaciones de medicamentos en línea. En villarreal navegador de Internet , Marga Gonsales a [email or web address] Vonnie clic en el usuario por Princess García? Meli McAlpin clic aquí en la sesión [place]. Verá la página de registro [place]. Ingrese villarreal código de [facility] lina y kristen aparece a continuación. Usted no tendrá que [employer] código después de heaven completado el proceso de registro . Si usted no se inscribe antes de la fecha de caducidad , debe solicitar un nuevo código. · MyChart Código de acceso : [id number] Expires: 2/14/2018  9:49 AM 
 
Ingresa los últimos cuatro dígitos de villarreal Número de Seguro Social ( xxxx ) y fecha de nacimiento ( dd / mm / aaaa ) kristen se indica y vonnie clic en Enviar. Usted será llevado a la siguiente página de registro . Crear un ID MyChart . Esta será villarreal ID de inicio de sesión de MyChart y no puede ser [place] , por lo que pensar en sunshine que es Glorine Flank y fácil de recordar . Crear sunshine contraseña MyChart . Usted puede cambiar villarreal contraseña en cualquier momento . Ingrese villarreal Password Reset de preguntas y [place] . [place] se puede utilizar en un momento posterior si usted olvida villarreal contraseña. Introduzca villarreal dirección de correo electrónico . Clearence Soulier recibirá sunshine notificación por correo electrónico cuando la nueva información está disponible en MyChart . Rui Lies clic en Registrarse. Mili Denier santos y descargar porciones de villarreal expediente médico. 
Vonnie clic en el enlace de descarga del menú Resumen para descargar sunshine copia portátil de villarreal información médica . Si tiene [employer] , por favor visite la sección de preguntas frecuentes del sitio web MyChart . Recuerde, MyChart NO es que se utilizará para las necesidades urgentes. Para emergencias médicas , llame al 911 . Ahora disponible en villarreal iPhone y Android ! Por favor proporcione jasmin resumen de la documentación de cuidado a villarreal próximo proveedor. Your primary care clinician is listed as NONE. If you have any questions after today's visit, please call [phone].

## 2018-02-05 NOTE — PROGRESS NOTES
Assessment/Plan:       ICD-10-CM ICD-9-CM    1. Obesity, morbid ([facility] [address] E66.01 278.01    2. PCOS (polycystic ovarian syndrome) E28.2 256.4      Follow-up Disposition:  Return for nutritionist soon; LK 4 weeks after. [address]  [address] [place] [address]  Phone: [phone] Fax: [phone]      [facility] CLINIC  Subjective:     Chief Complaint   Patient presents with    Follow-up     Follow up visit     Solomon Gambino is a 34 y.o. OTHER female. HPI:  [id number], [address] [place] [address]. Medications:taking as prescribed. Exercise: exercising every day. Just moved to a new apartment. Walking, using the gym, and dancing. Confident she can continue. Her period came on the 13th of January with lots of pain. Agreeable to seeing a nutritionist.  Disappointed that she has not lost weight. She  has a past medical history of Breast lump (04/2017). Review of Systems: Negative for: fever, chest pain, shortness of breath, leg swelling, exertional dyspnea, palpitations. Current Medications:   Current Outpatient Prescriptions on File Prior to Visit   Medication Sig    metFORMIN ER (GLUCOPHAGE XR) 500 mg tablet Take 1 Tab by mouth daily (with dinner). For insulin resistance    levonorgestrel-ethinyl estradiol (AVIANE, ALESSE, LESSINA) 0.1-20 mg-mcg tab Take 1 Tab by mouth daily. No current facility-administered medications on file prior to visit. Social History: She  reports that she has never smoked. She has never used smokeless tobacco. She reports that she does not drink alcohol or use illicit drugs. Objective:     Vitals:    02/05/18 1010   BP: 111/67   Pulse: 78   Temp: 98.3 °F (36.8 °C)   TempSrc: Oral   Weight: 228 lb (103.4 kg)    Patient's last menstrual period was 01/13/2018. Wt Readings from Last 2 Encounters:   02/05/18 228 lb (103.4 kg)   01/12/18 228 lb (103.4 kg)     No results found for any visits on 02/05/18. Physical Examination:  General appearance - well developed, no acute distress. Chest - clear to auscultation. Heart - regular rate and rhythm without murmurs, rubs, or gallops. Abdomen - bowel sounds present x 4, NT, ND. Extremities - pulses intact. No peripheral edema. Assessment/Plan:   Diagnoses and all orders for this visit:    1. Obesity, morbid ([address]    2. PCOS (polycystic ovarian syndrome)      Follow-up Disposition:  Return for nutritionist soon; LK 4 weeks after. Kylah Byrd DNP, FNP-BC, BC-ADM  Solomon Dudley expressed understanding of this plan.

## 2018-03-16 ENCOUNTER — OFFICE VISIT (OUTPATIENT)
Dept: FAMILY MEDICINE CLINIC | Age: 33
End: 2018-03-16

## 2018-03-16 DIAGNOSIS — Z71.3 DIETARY COUNSELING AND SURVEILLANCE: Primary | ICD-10-CM

## 2018-05-10 ENCOUNTER — OFFICE VISIT (OUTPATIENT)
Dept: FAMILY MEDICINE CLINIC | Age: 33
End: 2018-05-10

## 2018-05-10 VITALS
TEMPERATURE: 98.4 F | HEART RATE: 76 BPM | HEIGHT: 61 IN | DIASTOLIC BLOOD PRESSURE: 84 MMHG | BODY MASS INDEX: 42.86 KG/M2 | WEIGHT: 227 LBS | SYSTOLIC BLOOD PRESSURE: 121 MMHG

## 2018-05-10 DIAGNOSIS — N39.41 URGE INCONTINENCE OF URINE: Primary | ICD-10-CM

## 2018-05-10 DIAGNOSIS — N92.6 IRREGULAR MENSES: ICD-10-CM

## 2018-05-10 RX ORDER — LEVONORGESTREL AND ETHINYL ESTRADIOL 0.1-0.02MG
1 KIT ORAL DAILY
Qty: 1 DOSE PACK | Refills: 6 | Status: SHIPPED | OUTPATIENT
Start: 2018-05-10 | End: 2019-02-16 | Stop reason: SDUPTHER

## 2018-05-10 NOTE — PROGRESS NOTES
Assessment/Plan:       ICD-10-CM ICD-9-CM    1. Urge incontinence of urine N39.41 788.31    2. Irregular menses N92.6 626.4 levonorgestrel-ethinyl estradiol (AVIANE, ALESSE, LESSINA) 0.1-20 mg-mcg tab     Follow-up Disposition:  Return in about 2 months (around 7/10/2018) for LK. [address]con   [address] [address]  Phone: [phone] Fax: [phone]      [facility] CLINIC  Subjective:     Chief Complaint   Patient presents with    Weight Gain     f/u    Solomon Seaman Jess Oconnell is a 28 y.o. OTHER female.  #[id number]  HPI: Physical activity: 5 days a week and rests for 2 days. She is sweating now (believes she is working harder now than previously). Going to the gym in the building where she lives, walking 20-35 min, bicycling 30-35 min, lifting weights. She is doing  Both cardiovascular activities. Reports previously that her back hurt and her feet hurt - not feeling this now. She  has a past medical history of Breast lump (04/2017). Review of Systems: Negative for: fever, chest pain, shortness of breath, leg swelling, exertional dyspnea, palpitations. Current Medications:   Current Outpatient Prescriptions on File Prior to Visit   Medication Sig    metFORMIN ER (GLUCOPHAGE XR) 500 mg tablet Take 1 Tab by mouth daily (with dinner). For insulin resistance     No current facility-administered medications on file prior to visit. Social History: She  reports that she has never smoked. She has never used smokeless tobacco. She reports that she does not drink alcohol or use illicit drugs. Objective:     Vitals:    05/10/18 0838   BP: 121/84   Pulse: 76   Temp: 98.4 °F (36.9 °C)   TempSrc: Oral   Weight: 227 lb (103 kg)   Height: 5' 1.22\" (1.555 m)    Patient's last menstrual period was 05/02/2018.    Wt Readings from Last 2 Encounters:   05/10/18 227 lb (103 kg)   02/05/18 228 lb (103.4 kg)   Not losing weight but no more pain in legs and back. Harder time urinating. Sydnee when she drinks more fluids and green juice. Wakes up once at night. She has never had this before. Goes to the bathroom 12-15 times. Feels like not all the urine is coming out. Doesn't hurt or burn. No urination with sneezing or coughing - if she feels like she has to go. -Pills  -Non-medicine  [place] Caffeine? no  Green juice? Queta, lemon, cucumber, pineapple, cilantro, orange, celery. and water. This happens from 10 am to 3 pm.  She uses the gym in the morning 7 am to 8 am.    No results found for any visits on 05/10/18. Physical Examination:  General appearance - well developed, no acute distress. Chest - clear to auscultation. Heart - regular rate and rhythm without murmurs, rubs, or gallops. Abdomen - bowel sounds present x 4, NT, ND. Extremities - pulses intact. No peripheral edema. Assessment/Plan:   Diagnoses and all orders for this visit:    1. Urge incontinence of urine    2. Irregular menses  -     levonorgestrel-ethinyl estradiol (AVIANE, ALESSE, LESSINA) 0.1-20 mg-mcg tab; Take 1 Tab by mouth daily. Kegel exercises discussed. She declined medications for overactive bladder at this time. Follow-up Disposition:  Return in about 2 months (around 7/10/2018) for LK. Leonie Restrepo, GABINO, FNP-BC, BC-ADM  Solomon Lyles  expressed understanding of this plan.

## 2018-05-10 NOTE — PROGRESS NOTES
Coordination of Care  1. Have you been to the ER, urgent care clinic since your last visit? Hospitalized since your last visit? No    2. Have you seen or consulted any other health care providers outside of the [facility] since your last visit? Include any pap smears or colon screening. No    Does the patient need refills? YES    Learning Assessment Complete?  yes  Depression Screening complete in the past 12 months? yes

## 2018-05-10 NOTE — MR AVS SNAPSHOT
[address] [place] [address] [address] [id number] 
[phone] Patient: Rambo Kerr MRN: [id number] BUA:5/2/1940 Visit Information Russel Miguel y Shagufta Personal Médico Departamento Teléfono del Dep. Número de visita 5/10/2018  8:30 AM Juliet Cyr [facility] [phone] Follow-up Instructions Return in about 2 months (around 7/10/2018) for LK. Upcoming Health Maintenance Date Due DTaP/Tdap/Td series (1 - Tdap) 4/2/2005 PAP AKA CERVICAL CYTOLOGY 4/2/2005 Influenza Age 5 to Adult 8/1/2018 Alergias  Review Complete El: 5/10/2018 Por: MUNA Cyr del:  5/10/2018 No Known Allergies Vacunas actuales [id number] el:  11/16/2017 Cookie Railing Influenza Vaccine (Quad) PF 11/16/2017 No revisadas esta visita You Were Diagnosed With   
  
 Hollis Preston Irregular menses     ICD-10-CM: N92.6 ICD-9-CM: 626.4 Partes vitales PS Pulso Temperatura [place] ( percentil de crecimiento) Peso (percentil de crecimiento) LMP (última susanna) 121/84 (BP 1 Location: Right arm, BP Patient Position: Sitting) 76 98.4 °F (36.9 °C) (Oral) 5' 1.22\" (1.555 m) 227 lb (103 kg) 05/02/2018 BMI [facility]) Estado obstétrico Estatus de tabaquísmo 42.58 kg/m2 Having regular periods Never Smoker Historial de signos vitales BMI and BSA Data Body Mass Index Body Surface Area 42.58 kg/m 2 2.11 m 2 [facility] Name Phone [address] [facility], [address] [place] [address]  [phone] Lopez lista de medicamentos actualizada Lista actualizada 5/10/18  9:10 AM.  Tess Kelin use lopez lista de medicamentos más reciente. levonorgestrel-ethinyl estradiol 0.1-20 mg-mcg Tab También conocido kristen:  Tana Heredia Take 1 Tab by mouth daily. metFORMIN  mg tablet También conocido kristen:  GLUCOPHAGE XR Take 1 Tab by mouth daily (with dinner). For insulin resistance Recetas Enviado a la Carley Refills  
 levonorgestrel-ethinyl estradiol (AVIANE, CHUY, LESSINA) 0.1-20 mg-mcg tab 6 Sig: Take 1 Tab by mouth daily. Class: Normal  
 Pharmacy: [address] [place] [facility],[address] [facility], [facility] maryannekayleigh Hdz  #: [phone] Route: Oral  
  
Instrucciones de seguimiento Return in about 2 months (around 7/10/2018) for LK. Introducing [facility]! [facility] introduce portal paciente MyChart . Ahora se puede acceder a partes de villarreal expediente médico, enviar por correo electrónico la oficina de villarreal médico y solicitar renovaciones de medicamentos en línea. En villarreal navegador de Internet , Justina Morales a [email or web address] Vonnie clic en el usuario por Uriel Mehta? Dc Bake clic aquí en la sesión Coral Mims. Verá la página de registro Sh[place]oh. Ingrese villarreal código de [employer] lina y kristen aparece a continuación. Usted no tendrá que [employer] código después de heaven completado el proceso de registro . Si usted no se inscribe antes de la fecha de caducidad , debe solicitar un nuevo código. · MyChart Código de acceso : [id number] Expires: 6/17/2018  1:26 PM 
 
Ingresa los últimos cuatro dígitos de villarreal Número de Seguro Social ( xxxx ) y fecha de nacimiento ( dd / mm / aaaa ) kristen se indica y vonnie clic en Enviar. Usted será llevado a la siguiente página de registro . Crear un ID MyChart . Esta será villarreal ID de inicio de sesión de MyChart y no puede ser [place] , por lo que pensar en sunshine que es Carmen Agus y fácil de recordar . Crear sunshine contraseña MyChart . Usted puede cambiar villarreal contraseña en cualquier momento . Ingrese villarreal Password Reset de preguntas y [place] . [place] se puede utilizar en un momento posterior si usted olvida villarreal contraseña.  
Introduzca villarreal dirección de correo electrónico . Von Celine recibirá [employer] notificación por correo electrónico cuando la nueva información está disponible en MyChart . Triplett Gregg winters en Registrarse. Renetta Lucero santos y descargar porciones de villarreal expediente médico. 
Teodoro vianey en el enlace de descarga del menú Resumen para descargar sunshine copia portátil de villarreal información médica . Si tiene [employer] , por favor visite la sección de preguntas frecuentes del sitio web [email or web address] . Recuerde, Tuneprestot NO es que se utilizará para las necesidades urgentes. Para emergencias médicas , llame al 911 . Ahora disponible en villarreal iPhone y Android ! Por favor proporcione jasmin resumen de la documentación de cuidado a villarreal próximo proveedor. Your primary care clinician is listed as NONE. If you have any questions after today's visit, please call [phone].

## 2018-07-05 ENCOUNTER — OFFICE VISIT (OUTPATIENT)
Dept: FAMILY MEDICINE CLINIC | Age: 33
End: 2018-07-05

## 2018-07-05 VITALS
SYSTOLIC BLOOD PRESSURE: 108 MMHG | TEMPERATURE: 98.1 F | DIASTOLIC BLOOD PRESSURE: 69 MMHG | HEIGHT: 61 IN | HEART RATE: 74 BPM | WEIGHT: 219 LBS | BODY MASS INDEX: 41.35 KG/M2

## 2018-07-05 DIAGNOSIS — R63.4 WEIGHT LOSS: Primary | ICD-10-CM

## 2018-07-05 NOTE — PROGRESS NOTES
Assessment/Plan:       ICD-10-CM ICD-9-CM    1. Weight loss R63.4 783.21      Follow-up Disposition:  Return for follow up pap smear/follow up every 2 months weight maintenance. [address] Deepa Alarcon [address]  [address] [facility] [address]  Phone: [phone] Fax: [phone]      [facility]  Subjective:     Chief Complaint   Patient presents with    Weight Management     f/u, PCOS    Solomon Longoria is a 28 y.o. OTHER female. HPI: She continues to do physical activity between 3 and 4 times a week. She works nights and is constantly on the move. She is eating smaller portions and eating more fruits and vegetables. She says she has a new life. Her periods are regular and she has energy again. She brought both her children in with her today. When I asked her how confident she was about continuing with these habits, she said that she was certain she could continue. Reports her last pap smear [place] 1[place] years ago in her country. She'd like to have a baseline pap smear in [place]. Review of Systems: Negative for: fever, chest pain, shortness of breath, leg swelling, exertional dyspnea, palpitations. Current Medications:   Current Outpatient Prescriptions on File Prior to Visit   Medication Sig    levonorgestrel-ethinyl estradiol (AVIANE, ALESSE, LESSINA) 0.1-20 mg-mcg tab Take 1 Tab by mouth daily.  metFORMIN ER (GLUCOPHAGE XR) 500 mg tablet Take 1 Tab by mouth daily (with dinner). For insulin resistance     No current facility-administered medications on file prior to visit. Social History: She  reports that she has never smoked. She has never used smokeless tobacco. She reports that she does not drink alcohol or use illicit drugs.   Objective:     Vitals:    07/05/18 1019   BP: 108/69   Pulse: 74   Temp: 98.1 °F (36.7 °C)   TempSrc: Oral   Weight: 219 lb (99.3 kg)   Height: 5' 1.22\" (1.555 m)    Patient's last menstrual period was 06/28/2018. Wt [place]ings from Last 2 Encounters:   07/05/18 219 lb (99.3 kg)   05/10/18 227 lb (103 kg)   8 lbs lost!  No results found for any visits on 07/05/18. Physical Examination:  General appearance - well developed, no acute distress. Chest - clear to auscultation. Heart - regular rate and rhythm without murmurs, rubs, or gallops. Abdomen - bowel sounds present x 4, NT, ND. Extremities - pulses intact. No peripheral edema. Assessment/Plan:   Diagnoses and all orders for this visit:    1. Weight loss      Follow-up Disposition:  Return for follow up pap smear/follow up every 2 months weight maintenance. Tiarra Crouch, GABINO, FNP-BC, BC-ADM  Solomon Freeman expressed understanding of this plan.

## 2018-09-05 ENCOUNTER — OFFICE VISIT (OUTPATIENT)
Dept: FAMILY MEDICINE CLINIC | Age: 33
End: 2018-09-05

## 2018-09-05 ENCOUNTER — HOSPITAL ENCOUNTER (OUTPATIENT)
Dept: LAB | Age: 33
Discharge: HOME | End: 2018-09-05

## 2018-09-05 VITALS
TEMPERATURE: 98.2 F | SYSTOLIC BLOOD PRESSURE: 115 MMHG | WEIGHT: 222.6 LBS | HEART RATE: 75 BPM | BODY MASS INDEX: 41.76 KG/M2 | DIASTOLIC BLOOD PRESSURE: 77 MMHG

## 2018-09-05 DIAGNOSIS — Z01.419 WELL WOMAN EXAM WITH ROUTINE GYNECOLOGICAL EXAM: Primary | ICD-10-CM

## 2018-09-05 DIAGNOSIS — Z01.419 WELL WOMAN EXAM WITH ROUTINE GYNECOLOGICAL EXAM: ICD-10-CM

## 2018-09-05 PROCEDURE — 87491 CHLMYD TRACH DNA AMP PROBE: CPT | Performed by: FAMILY MEDICINE

## 2018-09-05 PROCEDURE — 87624 HPV HI-RISK TYP POOLED RSLT: CPT | Performed by: FAMILY MEDICINE

## 2018-09-05 PROCEDURE — 88175 CYTOPATH C/V AUTO FLUID REDO: CPT | Performed by: FAMILY MEDICINE

## 2018-09-05 NOTE — PROGRESS NOTES
Solomon Pinzon is a 28 y.o. female    Issues discussed today include:    Chief Complaint   Patient presents with    Gyn Exam     Last Pap 2 years ago       1) Well woman exam:  Here for pap smear appt. [place] 2 [place]rs ago [place] country, would like baseline in [address] today. Periods irreg in the past,  Now reg monthly since starting OCPs. Does not desire more children at this time. Monogamous with her , doesn't have great concern for STIs, but would like testing for GC/chlam today. No abnl vaginal discharge. No h/o abnl pap smear. , two boys aged 3 and 10 yo. H/o breast issue in L breast, had imaging and was neg - the spot has since disappeared and she denies breast sxs or concerns. Data reviewed or ordered today:       Other problems include:  Patient Active Problem List   Diagnosis Code    Left breast mass N63.20    PCOS (polycystic ovarian syndrome) E28.2    Weight gain R63.5    Obesity, morbid ([facility] [address] E66.01       Medications:  Current Outpatient Prescriptions   Medication Sig Dispense Refill    levonorgestrel-ethinyl estradiol (AVIANE, ALESSE, LESSINA) 0.1-20 mg-mcg tab Take 1 Tab by mouth daily. 1 Dose Pack 6    metFORMIN ER (GLUCOPHAGE XR) 500 mg tablet Take 1 Tab by mouth daily (with dinner). For insulin resistance 90 Tab 1       Allergies:  No Known Allergies    LMP:  Patient's last menstrual period was 2018. Social History     Social History    Marital status:      Spouse name: N/A    Number of children: N/A    Years of education: N/A     Occupational History    Not on file. Social History Main Topics    Smoking status: Never Smoker    Smokeless tobacco: Never Used    Alcohol use No    Drug use: No    Sexual activity: Not on file     Other Topics Concern    Not on file     Social History Narrative       No family history on file.       Physical Exam   Visit Vitals    /77 (BP 1 Location: Left arm)    Pulse 75    Temp 98.2 °F (36.8 °C) (Oral)    Wt 222 lb 9.6 oz (101 kg)    LMP 08/24/2018    BMI 41.76 kg/m2      BP [place]ings from Last 3 Encounters:   09/05/18 115/77   07/05/18 108/69   05/10/18 121/84     Constitutional: Appears well,  No acute distress, Vitals noted  Psychiatric:  Affect normal, Alert and Oriented to person/place/time  Eyes:  Conjunctiva clear, no drainage  ENT:  External ears and nose normal, Mucous membranes moist  Neck:  General inspection normal. Supple. Lungs:  No respiratory distress   : Normal appearing vulva and vagina, scant white vaginal discharge, no cervical changes or discharge  Extremities: Without edema, good peripheral pulses  Skin:  Warm to palpation, without rashes      Assessment/Plan:      ICD-10-CM ICD-9-CM    1. Well woman exam with routine gynecological exam Z01.419 V72.31 PAP IG, APTIMA HPV AND RFX 16/18,45 ([id number])      CHLAMYDIA/GC PCR     28 y.o. female with h/o obesity, PCOS here for well woman gyn exam. Denies concerns with breast and declines exam today.   Pap smear and GC/chlam collected today  Continue efforts for wt loss    Follow-up Disposition:  Return in about 1 year (around 9/5/2019) for Well woman exam.        Ann Urban MD  [address] [place] [address], Cary Hermosillo

## 2018-09-05 NOTE — MR AVS SNAPSHOT
[address] [place] [address] Edmundo 7 [id number] 
[phone] Patient: Hung Diaz MRN: [id number] GJY:[id number] Visit Information Sapna Ranulfo y Marylui Personal Médico Departamento Teléfono del Dep. Número de visita 9/5/2018 10:10 AM Ashley Gill [facility] [phone] [id number] Follow-up Instructions Return in about 1 year (around 9/5/2019) for Well woman exam. Upcoming Health Maintenance Date Due DTaP/Tdap/Td series (1 - Tdap) 4/2/2005 PAP AKA CERVICAL CYTOLOGY 4/2/2005 Influenza Age 5 to Adult 8/1/2018 Alergias  Review Complete El: 9/5/2018 Por: Ashley Gill MD  
 A partir del:  9/5/2018 No Known Allergies Vacunas actuales RHICJLLTH el:  11/16/2017 Cherry Amabile Influenza Vaccine (Quad) PF 11/16/2017 No revisadas esta visita You Were Diagnosed With   
  
 [place] Well woman exam with routine gynecological exam    -  Primary ICD-10-CM: M56.370 ICD-9-CM: V72.31 Partes vitales PS Pulso Temperatura Peso (percentil de crecimiento) LMP (última susanna) BMI (IMC)  
 115/77 (BP 1 Location: Left arm) 75 98.2 °F (36.8 °C) (Oral) 222 lb 9.6 oz (101 kg) 08/24/2018 41.76 kg/m2 Estado obstétrico Estatus de tabaquísmo Having regular periods Never Smoker Historial de signos vitales BMI and BSA Data Body Mass Index Body Surface Area 41.76 kg/m 2 2.09 m 2 [facility] Name Phone [address] 091 [facility], [address] [place] [address] Dr [phone] Lopez lista de medicamentos actualizada David Menezes actualizada 9/5/18 10:42 AM.  Steve Pendleton use lopez lista de medicamentos más reciente. levonorgestrel-ethinyl estradiol 0.1-20 mg-mcg Tab También conocido kristen:  Sadaf Bernard Take 1 Tab by mouth daily. metFORMIN  mg tablet También conocido kristen:  GLUCOPHAGE XR Take 1 Tab by mouth daily (with dinner). For insulin resistance Instrucciones de seguimiento Return in about 1 year (around 9/5/2019) for [facility] woman exam.  
  
Por hacer 09/05/2018 Microbiology:  CHLAMYDIA/GC PCR   
  
 09/05/2018 Pathology:  PAP IG, APTIMA HPV AND RFX 16/18,45 ([id number]) Instrucciones para el Paciente Visita de control para personas de 18 a 50 años: Instrucciones de cuidado - [ [facility] Visit, Ages 25 to 48: Care Instructions ] Instrucciones de cuidado Los exámenes físicos pueden ayudarle a mantenerse saludable. Villarreal médico moura revisado villarreal estado general de matt y podría haberle dado algunos consejos para cuidarse. También podría haberle recomendado otros exámenes. En villarreal hogar, usted puede ayudar a prevenir enfermedades si come de manera saludable, hace ejercicio con regularidad y sigue otras recomendaciones. La atención de seguimiento es sunshine parte clave de villarreal tratamiento y seguridad. Asegúrese de hacer y acudir a todas las citas, y llame a villarreal médico si está teniendo problemas. También es sunshine buena idea saber los resultados de gurinder exámenes y mantener sunshine lista de los medicamentos que nata. Cómo puede cuidarse en el hogar? · Alcance un peso saludable y [place]. Alvarado disminuirá el riesgo de tener [employer], tales kristen obesidad, diabetes, enfermedad cardíaca y presión arterial sebastian. · Teodoro actividad física por lo menos 30 minutos la mayoría de los días de la [place]. Caminar es sunshine buena opción. Paul Speck desee hacer otras actividades, kristen correr, nadar, [employer], o jugar al tenis u otros deportes de equipo. Discuta con villarreal médico cualquier cambio que quiera introducir en villarreal programa de ejercicios. · No fume ni permita que otros fumen cerca de usted.  Si necesita ayuda para dejar de fumar, hable con villarreal médico sobre programas y [employer] para dejar de fumar. Pueden aumentar gurinder probabilidades de dejar el hábito para siempre. · Consulte con villarreal médico si presenta factores de riesgo de infecciones de transmisión sexual (STI, por gurinder siglas en inglés). Tener sunshine isaiah clary sexual (que no tenga STI y que no tenga relaciones sexuales con nadie más) es sunshine buena manera de evitar estas infecciones. · Utilice métodos anticonceptivos si no desea tener hijos en jasmin momento. Consulte con villarreal médico acerca de las opciones disponibles más adecuadas para usted. · Protéjase la piel del exceso de sol. [address] 10 a.m. y las 4 p.m., permanezca a la fam o Fredie Velasquez con prendas de vestir y un sombrero de ala ancha. Use gafas de sol que bloqueen los erik ultravioleta. Póngase un protector solar de amplio espectro (SPF 30 o superior) en la piel expuesta, incluso cuando esté nublado. · Acuda al dentista [facility] veces al año para hacerse chequeos y limpiezas dentales. · En el automóvil, use el cinturón de seguridad. · Aliyah alcohol en forma moderada, o no aliyah nada. El [place] significa no más de 2 bebidas al día si es hombre, y no más de 1 al día si es Lane. Siga las recomendaciones de villarreal médico acerca de cuándo hacerse determinados exámenes. Estas pruebas pueden detectar problemas a tiempo. Para ambos sexos · Colesterol. Hágase un análisis de la grasa (colesterol) en la юлия después de los 21 años de [place]. Villarreal médico le indicará con qué frecuencia debe [employer] análisis según villarreal edad, gurinder antecedentes familiares u otros factores que pueden aumentar el riesgo de enfermedad cardíaca. · Presión arterial. Hágase guillermo la presión arterial navid sunshine visita de rutina al médico. Villarreal médico le dirá con qué frecuencia debe revisarse la presión arterial según villarreal edad, gurinder niveles de presión arterial y otros factores. · Visión. Hable con villarreal médico acerca de la frecuencia con que debe hacerse sunshine prueba de glaucoma. · Diabetes. Pregúntele a lopez médico si debería hacerse pruebas para la diabetes. · Cáncer de colon. Hágase sunshine prueba para el cáncer de colon a los 48 años. Usted podría hacerse sunshine de las [address] Si tiene menos de [address], podría necesitar hacerse sunshine prueba antes si tiene factores de [place]. Los factores de riesgo incluyen si ya le demarco extraído un pólipo precanceroso del colon o si alguno de gurinder padres, hermanos o hijos moura tenido cáncer de colon. [address] · El examen de senos y la [place]. Pregúntele a lopez médico cuándo debe hacerse un examen clínico de los senos (mamas) y [place]. Los expertos médicos no están de acuerdo en si sunshine vito de menos de 48 años de edad debe o no hacerse estos exámenes o con qué frecuencia. Lopez médico puede ayudarle a decidir qué es lo adecuado para usted. · La prueba de Papanicolaou y el examen [place] a hacerse pruebas de Papanicolaou a los 21 años. El Papanicolaou es la mejor manera de detectar el cáncer de bill uterino. Esta prueba suele ser parte del examen pélvico. Pregunte con qué frecuencia debe hacerse esta prueba. · Infecciones de transmisión sexual (STI, por gurinder siglas en inglés). Consulte si debe hacerse pruebas de detección de STI. Puede correr riesgo si tiene [employer] con más de Toyin Race persona, especialmente si gurinder parejas no utilizan condones. Para los hombres · Infecciones de transmisión sexual (STI). Consulte si debe hacerse pruebas de detección de STI. Puede correr riesgo si tiene [employer] con más de Toyin Race persona, especialmente si usted no utiliza condón. · Examen para el cáncer testicular. Pregúntele a lopez médico si debería hacerse un examen de testículos con regularidad. · Examen de próstata. Hable con lopez médico para saber si debe hacerse un análisis de юлия para el cáncer de próstata (prueba del PSA, por gurinder siglas en inglés).  Los expertos difieren en cuanto a si los hombres deben hacerse esta prueba y con qué frecuencia. Algunos especialistas lo recomiendan a las personas mayores de 39 años de origen afroamericano o que tienen un padre o un chinedu que tuvo cáncer de próstata antes de los 65 Los daniela. Cuándo debe pedir ayuda? Preste especial atención a los cambios en villarreal matt y asegúrese de comunicarse con villarreal médico si tiene problemas o síntomas que le preocupan. Dónde puede encontrar más información en inglés? Raymond Lulu a [email or web address] Escriba P072 en la búsqueda para aprender más acerca de \"Visita de control para personas de 18 a 50 años: Instrucciones de cuidado - [ Well Visit, Ages 25 to 48: Care Instructions ]. \" 
Revisado: 16 [place], 2017 Versión del contenido: 11.7 © 1902-6915 Healthwise, Incorporated. Las instrucciones de cuidado fueron adaptadas bajo licencia por Good [employer] (which disclaims liability or warranty for this information). Si usted tiene [place] afección médica o sobre estas instrucciones, siempre pregunte a villarreal profesional de matt. Healthwise, Incorporated niega toda garantía o responsabilidad por villarreal uso de esta información. Aprenda sobre la detección de cáncer de bill uterino - [ Learning About Cervical Cancer Screening ] Qué es sunshine prueba de detección de cáncer de bill uterino? Las pruebas de detección de cáncer de bill uterino buscan la presencia de cáncer de bill uterino. El bill uterino es la parte inferior del útero que desemboca en la vagina. La prueba puede ayudar a villarreal médico a detectar cambios tempranos en las células que pudieran convertirse en cáncer. Pueden [employer] para detectar el cáncer de bill uterino. Pueden usarse solas o juntas. Prueba de Papanicolaou. Esta prueba revisa si hay modificaciones en las células del bill uterino. Las células anormales pueden ser sunshine señal de cáncer. Prueba del virus del papiloma humano (VPH). Esta prueba detecta el virus del papiloma humano (VPH). Algunos tipos de VPH pueden causar cáncer. Seema cualquiera de las [place], el médico o la enfermera le introducirá un instrumento llamado espéculo en la vagina. El espéculo separa suavemente las pinzon vaginales. [place]bo le permite a villarreal médico santos el interior de la vagina y del bill uterino. Él o harmeet [place] un pequeño hisopo o cepillo para recolectar muestras de células de villarreal bill uterino. Trate de programar el examen cuando no tenga el período menstrual. A fin de prepararse para la prueba, evite duchas vaginales, tampones, y medicamentos, aerosoles y [employer] vaginales por al menos un día antes de hacerse la prueba. Cuándo debería hacerse sunshine prueba de detección? Estas pautas se aplican a mujeres que corren un riesgo promedio de tener cáncer de bill uterino. Si no sabe cuál es villarreal riesgo, hable con villarreal médico. 
Mujeres de 21 a 29 años · Usted puede empezar a hacerse la prueba de Papanicolaou a los 21 años. Se hace cada 3 años. · Usted puede empezar a hacerse la prueba primaria de VPH a los 25 años. Se hace cada 3 años. Mujeres de 30 a 59 años · Si la vez pasada se hizo la prueba de Papanicolaou y la de VPH y ambas tuvieron [place], usted puede hacerse sunshine prueba de Papanicolaou más sunshine prueba de VPH cada 5 años. · Si la vez pasada solo se hizo sunshine prueba de Papanicolaou, en tanto que gurinder resultados mary normales, usted puede hacerse sunshine prueba de Papanicolaou cada 3 años. · Si la vez pasada solo se hizo sunshine prueba de VPH, en tanto que gurinder resultados mary FRUFÄLLAN, usted se puede hacer sunshine prueba de VPH cada 3 años. Mujeres de 72 años y [employer] · Si usted tiene 72 años o más, hable con villarreal médico acerca de lo que sea adecuado para usted. Las mujeres de 72 años y mayores podrían dejar de necesitar hacerse pruebas de detección de cáncer de bill uterino.  El momento de dejar de hacerse pruebas de detección depende de gurinder antecedentes de [facility], villarreal estado de matt en general y villarreal riesgo de cambios celulares en el bill uterino o de cáncer de bill uterino. Pilar Robles después de la prueba? La muestra de células que se nata navid villarreal prueba se enviará a un laboratorio para que un experto pueda observar las células. Por lo general, nata sunshine o [place] para recibir [place]. Pruebas de Papanicolaou · Un resultado normal significa que la prueba no detectó ninguna célula anormal en la muestra. · Un resultado anormal puede significar muchas cosas. La mayoría de estas no son cáncer. [employer] de villarreal prueba pueden ser anormales porque usted: ¨ Tiene sunshine infección en la vagina o el bill uterino, kristen sunshine infección por hongos en forma de Noé Singh. ¨ Tiene un DIU (dispositivo intrauterino anticonceptivo). ¨ Tiene bajos niveles de estrógeno después de la menopausia que provocan cambios en las células. ¨ Tiene cambios celulares que podrían ser señal de precáncer o cáncer. Los [employer] se clasifican según la gravedad de los [place]. Pruebas de VPH 
· Un resultado normal significa que la prueba no detectó ningún VPH de alto riesgo en [facility]. · Mckenzie Majestic prueba de VPH anormal no significa que usted tenga cáncer de bill uterino. Puede significar que usted está infectada con katharine o más tipos de alto riesgo de VPH. [place] aumenta villarreal probabilidad de tener cambios celulares que podrían ser señal de precáncer o cáncer. La atención de seguimiento es sunshine parte clave de villarreal tratamiento y seguridad. Asegúrese de hacer y acudir a todas las citas, y llame a villarreal médico si está teniendo problemas. También es sunshine buena idea saber los resultados de gurinder exámenes y mantener sunshine lista de los medicamentos que nata. Dónde puede encontrar más información en inglés? Nunica Luke a [email or web address]  
Escriba P919 en la búsqueda para aprender más acerca de \"Aprenda sobre la detección de cáncer de bill uterino - [ Learning About Cervical Cancer Screening ]. \" 
Revisado: 31 enero, 2018 Versión del contenido: 11.7 © 7572-0958 Healthwise, Incorporated. Las instrucciones de cuidado fueron adaptadas bajo licencia por Good Help Connections (which disclaims liability or warranty for this information). Si usted tiene [place] Lucas afección médica o sobre estas instrucciones, siempre pregunte a villarreal profesional de matt. Healthwise, Incorporated niega toda garantía o responsabilidad por villarreal uso de esta información. Introducing [facility]! [facility] introduce portal paciente Jocelinhart . Ahora se puede acceder a partes de villarreal expediente médico, enviar por correo electrónico la oficina de villarreal médico y solicitar renovaciones de medicamentos en línea. En villarreal navegador de Internet , Caralee Carlota a [email or web address] Vonnie clic en el usuario por Raciel Wheeler? Veto Home clic aquí en la sesión Loudoun Meeker. Verá la página de registro [place]. Ingrese villarreal código de [employer] lina y kristen aparece a continuación. Usted no tendrá que [employer] código después de heaven completado el proceso de registro . Si usted no se inscribe antes de la fecha de caducidad , debe solicitar un nuevo código. · MyChart Código de acceso : [id number] Expires: 12/4/2018 10:42 AM 
 
Ingresa los últimos cuatro dígitos de villarreal Número de Seguro Social ( xxxx ) y fecha de nacimiento ( dd / mm / aaaa ) kristen se indica y vonnie clic en Enviar. ted será llevado a la siguiente página de registro . Crear un ID MyChart . Esta será villarreal ID de inicio de sesión de MyChart y no puede ser [place] , por lo que pensar en sunshine que es [place]  y fácil de recordar . Crear sunshine contraseña MyChart . Usted puede cambiar villarreal contraseña en cualquier momento . Ingrese villarreal Password Reset de preguntas y [place] . [place] se puede utilizar en un momento posterior si usted olvida villarreal contraseña. Introduzca villarreal dirección de correo electrónico . Cheyanne Rinne recibirá sunshine notificación por correo electrónico cuando la nueva información está disponible en MyChart . Elan Morales clic en Registrarse. Nain Bajwa santos y descargar porciones de villarreal expediente médico. 
Teodoro cldandre en el enlace de descarga del menú Resumen para descargar sunshine copia portátil de villarreal información médica . Si tiene [employer] , por favor visite la sección de preguntas frecuentes del sitio web MyChart . Recuerde, Press[employer] NO es que se utilizará para las necesidades urgentes. Para emergencias médicas , llame al 911 . Ahora disponible en villarreal iPhone y Android ! Por favor proporcione jasmin resumen de la documentación de cuidado a villarreal próximo proveedor. Your primary care clinician is listed as NONE. If you have any questions after today's visit, please call [phone].

## 2018-09-05 NOTE — PATIENT INSTRUCTIONS
Visita de control para personas de 18 a 50 años: Instrucciones de cuidado - [ Well Visit, Ages 25 to 48: Care Instructions ]  Instrucciones de cuidado    Los exámenes físicos pueden ayudarle a mantenerse saludable. Villarreal médico moura revisado villarreal estado general de matt y podría haberle dado algunos consejos para cuidarse. También podría haberle recomendado otros exámenes. En villarreal hogar, usted puede ayudar a prevenir enfermedades si come de manera saludable, hace ejercicio con regularidad y sigue otras recomendaciones. La atención de seguimiento es sunshine parte clave de villarreal tratamiento y seguridad. Asegúrese de hacer y acudir a todas las citas, y llame a villarreal médico si está teniendo problemas. También es sunshine buena idea saber los resultados de gurinder exámenes y mantener sunshine lista de los medicamentos que nata. ¿Cómo puede cuidarse en el hogar? · Alcance un peso saludable y [place]. [place] disminuirá el riesgo de tener [employer], tales kristen obesidad, diabetes, enfermedad cardíaca y presión arterial sebastian. · Teodoro actividad física por lo menos 30 minutos la mayoría de los días de la [place]. Caminar es sunshine buena opción. Media Nasim desee hacer otras actividades, kristen correr, nadar, [employer], o jugar al tenis u otros deportes de equipo. Discuta con villarreal médico cualquier cambio que quiera introducir en villarreal programa de ejercicios. · No fume ni permita que otros fumen cerca de usted. Si necesita ayuda para dejar de fumar, hable con villarreal médico sobre programas y medicamentos para dejar de fumar. Pueden aumentar gurinder probabilidades de dejar el hábito para siempre. · Consulte con villarreal médico si presenta factores de riesgo de infecciones de transmisión sexual (STI, por gurinder siglas en inglés). Tener sunshine isaiah clary sexual (que no tenga STI y que no tenga relaciones sexuales con nadie más) es sunshine buena manera de evitar estas infecciones. · Utilice métodos anticonceptivos si no desea tener hijos en jasmin momento.  Consulte con villarreal médico acerca de las opciones disponibles más adecuadas para usted. · Protéjase la piel del exceso de sol. [address] 10 a.m. y las 4 p.m., permanezca a la fam o [place] Gold con prendas de vestir y un sombrero de ala ancha. Use gafas de sol que bloqueen los erik ultravioleta. Póngase un protector solar de amplio espectro (SPF 30 o superior) en la piel expuesta, incluso cuando esté nublado. · Acuda al dentista [facility] veces al año para hacerse chequeos y limpiezas dentales. · En el automóvil, use el cinturón de seguridad. · Aliyah alcohol en forma moderada, o no aliyah nada. [place] significa no más de 2 bebidas al día si es hombre, y no más de 1 al día si es [place]. Siga las recomendaciones de lopez médico acerca de cuándo hacerse determinados exámenes. Estas pruebas pueden detectar problemas a tiempo. Para ambos sexos  · Colesterol. Hágase un análisis de la grasa (colesterol) en la юлия después de los 21 años de Ebene[place]. Lopez médico le indicará con qué frecuencia debe [employer] análisis según lopez edad, gurinder antecedentes familiares u otros factores que pueden aumentar el riesgo de enfermedad cardíaca. · Presión arterial. Hágase guillermo la presión arterial navid sunshine visita de rutina al médico. Lopez médico le dirá con qué frecuencia debe revisarse la presión arterial según lopez edad, gurinder niveles de presión arterial y otros factores. · Visión. Hable con lopez médico acerca de la frecuencia con que debe hacerse sunshine prueba de glaucoma. · Diabetes. Pregúntele a lopez médico si debería hacerse pruebas para la diabetes. · Cáncer de colon. Hágase sunshine prueba para el cáncer de colon a los 48 años. Usted podría hacerse sunshine de las [address] Si tiene menos de [address], podría necesitar hacerse sunshine prueba antes si tiene factores de [place]. Los factores de riesgo incluyen si ya le demarco extraído un pólipo precanceroso del colon o si alguno de gurinder padres, hermanos o hijos moura tenido cáncer de colon.   [address] muje[address]  · El examen de senos y la [place]. Pregúntele a lopez médico cuándo debe hacerse un examen clínico de los senos (mamas) y [place]. Los expertos médicos no están de acuerdo en si sunshine vito de menos de 48 años de edad debe o no hacerse estos exámenes o con qué frecuencia. Lopez médico puede ayudarle a decidir qué es lo adecuado para usted. · La prueba de Papanicolaou y el examen [place] a hacerse pruebas de Papanicolaou a los 21 años. El Papanicolaou es la mejor manera de detectar el cáncer de bill uterino. Esta prueba suele ser parte del examen pélvico. Pregunte con qué frecuencia debe hacerse esta prueba. · Infecciones de transmisión sexual (STI, por gurinder siglas en inglés). Consulte si debe hacerse pruebas de detección de STI. Puede correr riesgo si tiene [employer] con más de [place] persona, especialmente si gurinder parejas no utilizan condones. Para los hombres  · Infecciones de transmisión sexual (STI). Consulte si debe hacerse pruebas de detección de STI. Puede correr riesgo si tiene [employer] con más de [place] persona, especialmente si usted no utiliza condón. · Examen para el cáncer testicular. Pregúntele a lopez médico si debería hacerse un examen de testículos con regularidad. · Examen de próstata. Hable con lopez médico para saber si debe hacerse un análisis de юлия para el cáncer de próstata (prueba del PSA, por gurinder siglas en inglés). Los expertos difieren en cuanto a si los hombres deben hacerse esta prueba y con qué frecuencia. Algunos especialistas lo recomiendan a las personas mayores de 39 años de origen afroamericano o que tienen un padre o un chinedu que tuvo cáncer de próstata antes de los 65 [place] dan[place]. ¿Cuándo debe pedir ayuda? Preste especial atención a los cambios en lopez matt y asegúrese de comunicarse con lopez médico si tiene problemas o síntomas que le preocupan. ¿Dónde puede encontrar más información en inglés?   Alicia Imam a http://yulia-radha.info/. Escriba P072 en la búsqueda para aprender más acerca de \"Visita de control para personas de 18 a 50 años: Instrucciones de cuidado - [ Well Visit, Ages 25 to 48: Care Instructions ]. \"  Revisado: 16 gonzalez, 2017  Versión del contenido: 11.7  © 8203-9304 Healthwise, Incorporated. Las instrucciones de cuidado fueron adaptadas bajo licencia por [employer] (which disclaims liability or warranty for this information). Si usted tiene [place] afección médica o sobre estas instrucciones, siempre pregunte a villarreal profesional de matt. Healthwise, Incorporated niega toda garantía o responsabilidad por villarreal uso de esta información. Aprenda sobre la detección de cáncer de bill uterino - [ Learning About Cervical Cancer Screening ]  ¿Qué es sunshine prueba de detección de cáncer de bill uterino? Las pruebas de detección de cáncer de bill uterino buscan la presencia de cáncer de bill uterino. El bill uterino es la parte inferior del útero que desemboca en la vagina. La prueba puede ayudar a villarreal médico a detectar cambios tempranos en las células que pudieran convertirse en cáncer. Pueden [employer] para detectar el cáncer de bill uterino. Pueden usarse solas o juntas. Prueba de Papanicolaou. Esta prueba revisa si hay modificaciones en las células del bill uterino. Las células anormales pueden ser sunshine señal de cáncer. Prueba del virus del papiloma humano (VPH). Esta prueba detecta el virus del papiloma humano (VPH). Algunos tipos de VPH pueden causar cáncer. Seema cualquiera de las [place], el médico o la enfermera le introducirá un instrumento llamado espéculo en la vagina. El espéculo separa suavemente las pinzon vaginales. [place] le permite a villarreal médico santos el interior de la vagina y del bill uterino. Él o harmeet [place] un pequeño hisopo o cepillo para recolectar muestras de células de villarreal bill uterino.   Trate de programar el examen cuando no tenga el período menstrual. A fin de prepararse para la prueba, evite duchas vaginales, tampones, y medicamentos, aerosoles y [employer] vaginales por al menos un día antes de hacerse la prueba. ¿Cuándo debería hacerse sunshine prueba de detección? Estas pautas se aplican a mujeres que corren un riesgo promedio de tener cáncer de bill uterino. Si no sabe cuál es villarreal riesgo, hable con villarreal médico.  Mujeres de 21 a 29 años  · Usted puede empezar a hacerse la prueba de Papanicolaou a los 21 años. Se hace cada 3 años. · Usted puede empezar a hacerse la prueba primaria de VPH a los 25 años. Se hace cada 3 años. Mujeres de 30 a 59 años  · Si la vez pasada se hizo la prueba de Papanicolaou y la de VPH y ambas tuvieron [place], usted puede hacerse sunshine prueba de Papanicolaou más sunshine prueba de VPH cada 5 años. · Si la vez pasada solo se hizo sunshine prueba de Papanicolaou, en tanto que gurinder resultados mary normales, usted puede hacerse sunshine prueba de Papanicolaou cada 3 años. · Si la vez pasada solo se hizo sunshine prueba de VPH, en tanto que gurinder resultados mary [place], usted se puede hacer sunshine prueba de VPH cada 3 años. Mujeres de 72 años y mayores  · Si usted tiene 72 años o más, hable con villarreal médico acerca de lo que sea adecuado para usted. Las mujeres de 72 años y mayores podrían dejar de necesitar hacerse pruebas de detección de cáncer de bill uterino. El momento de dejar de hacerse pruebas de detección depende de gurinder antecedentes de matt, villarreal estado de matt en general y villarreal riesgo de cambios celulares en el bill uterino o de cáncer de bill uterino. ¿Qué ocurre después de la prueba? La muestra de células que se nata navid villarreal prueba se enviará a un laboratorio para que un experto pueda observar las células. Por lo general, nata sunshine o [place] para recibir [place]. Pruebas de Papanicolaou  · Un resultado normal significa que la prueba no detectó ninguna célula anormal en la muestra.   · Un resultado anormal puede significar muchas cosas. La mayoría de estas no son cáncer. [employer] de villarreal prueba pueden ser anormales porque usted:  ¨ Tiene sunshine infección en la vagina o el bill uterino, kristen sunshine infección por hongos en forma de levadura. ¨ Tiene un DIU (dispositivo intrauterino anticonceptivo). ¨ Tiene bajos niveles de estrógeno después de la menopausia que provocan cambios en las células. ¨ Tiene cambios celulares que podrían ser señal de precáncer o cáncer. Los [employer] se clasifican según la gravedad de los [place]. Pruebas de VPH  · Un resultado normal significa que la prueba no detectó ningún VPH de alto riesgo en la [place]. · Karn Klinefelter prueba de VPH anormal no significa que usted tenga cáncer de bill uterino. Puede significar que usted está infectada con katharine o más tipos de alto riesgo de VPH. [place] aumenta villarreal probabilidad de tener cambios celulares que podrían ser señal de precáncer o cáncer. La atención de seguimiento es sunshine parte clave de villarreal tratamiento y seguridad. Asegúrese de hacer y acudir a todas las citas, y llame a villarreal médico si está teniendo problemas. También es sunshine buena idea saber los resultados de gurinder exámenes y mantener sunshine lista de los medicamentos que nata. ¿Dónde puede encontrar más información en inglés? Kai Henry a [email or web address]-radha.info/. Escriba P919 en la búsqueda para aprender más acerca de \"Aprenda sobre la detección de cáncer de bill uterino - [ Learning About Cervical Cancer Screening ]. \"  Revisado: 31 enero, 2018  Versión del contenido: 11.7  © 9595-3641 [employer], Incorporated. Las instrucciones de cuidado fueron adaptadas bajo licencia por [employer] (which disclaims liability or warranty for this information). Si usted tiene [place] afección médica o sobre estas instrucciones, siempre pregunte a villarreal profesional de matt.  [employer] niega toda garantía o responsabilidad por villarreal uso de esta información.

## 2018-09-05 NOTE — PROGRESS NOTES
Coordination of Care  1. Have you been to the ER, urgent care clinic since your last visit? Hospitalized since your last visit? No    2. Have you seen or consulted any other health care providers outside of the [address] [place] [address] since your last visit? Include any pap smears or colon screening. No    Does the patient need refills? NO    Learning Assessment Complete?  yes  Depression Screening complete in the past 12 months? yes

## 2018-09-07 LAB
C TRACH DNA SPEC QL NAA+PROBE: NEGATIVE
C TRACH RRNA SPEC QL NAA+PROBE: NEGATIVE
N GONORRHOEA DNA SPEC QL NAA+PROBE: NEGATIVE
N GONORRHOEA RRNA SPEC QL NAA+PROBE: NEGATIVE
SAMPLE TYPE: NORMAL
SERVICE CMNT-IMP: NORMAL
SPECIMEN SOURCE: NORMAL
SPECIMEN SOURCE: NORMAL

## 2018-09-11 NOTE — PROGRESS NOTES
Pap smear NILM and HPV negative  Next due 5 years ~ 9/2023  Patient to be informed via letter along with GC/Chlam results

## 2019-02-14 ENCOUNTER — TELEPHONE (OUTPATIENT)
Dept: FAMILY MEDICINE CLINIC | Age: 34
End: 2019-02-14

## 2019-02-14 NOTE — TELEPHONE ENCOUNTER
Pt calling for refills on medication . Levonorgestrel-ethinyl-estradiol. Routing to provider.  Nia Alcala RN

## 2019-02-15 ENCOUNTER — TELEPHONE (OUTPATIENT)
Dept: FAMILY MEDICINE CLINIC | Age: 34
End: 2019-02-15

## 2019-02-15 NOTE — LETTER
2/18/2019 10:00 AM 
 
Ms. Carbajal A Oses Benita Mckinney [address] [place] [address] Dear Ms. Winstonbaldemar Santos, Return to the [facility] in September 2019: to see the doctor for additional refills. Vuelva a la [facility] en Septiembre de 2019: santos al médico para recargas adicionales.  
 
 
 
 
 
 
 
 
 
Sincerely, 
 
 
Dr. Patel Kidd, RN

## 2019-02-15 NOTE — TELEPHONE ENCOUNTER
Patient call office 2/15/19 saying she needs more refill for her birthcontrol pills . She says she run out on Sunday and that she don't know what to do if she needs another appointment or if a physician would prescribe her more medicine .     Jose Luis Fragoso

## 2019-02-16 DIAGNOSIS — N92.6 IRREGULAR MENSES: ICD-10-CM

## 2019-02-16 RX ORDER — LEVONORGESTREL AND ETHINYL ESTRADIOL 0.1-0.02MG
1 KIT ORAL DAILY
Qty: 1 DOSE PACK | Refills: 6 | Status: SHIPPED | OUTPATIENT
Start: 2019-02-16

## 2019-02-16 NOTE — TELEPHONE ENCOUNTER
Refill on OCPs sent.  Should be seen annually for refills, next ~ 9/2019 or sooner if acute concerns

## 2019-02-18 NOTE — TELEPHONE ENCOUNTER
I am mailing pt a letter to remind her to return in September to see the provider for additional refills.  Kyara Chavira RN

## 2019-05-01 NOTE — TELEPHONE ENCOUNTER
Patient has an appointment on 5/12 for a mammogram and she would like to get all the details again. She lost her sheet. Please call to go over everything again. Yes

## 2019-09-10 ENCOUNTER — TELEPHONE (OUTPATIENT)
Dept: FAMILY MEDICINE CLINIC | Age: 34
End: 2019-09-10

## 2019-09-10 NOTE — TELEPHONE ENCOUNTER
Solomon Angeles Rhea called main office 9/10/19  Wanted to talk to a nurse . Patient says that she went to [facility] to  her birthcontrol pills and that they didn't have any for her and they told her that she needs to f/u with her PCP . Patient would like to know if she has to make a F/U apt with the [facility] or what she has to do in order to aye pills.     Thank you

## 2019-09-10 NOTE — LETTER
10/3/2019 10:18 AM 
 
Ms. Solomon Peraza [address] [place] [address] Dear Ms. Desiree Arreola, Please come to the [facility] to see the doctor. Por favor venga a la [facility] para santos al doctor. Sincerely, LIBAN Calvillo/Yecenia Gtz RN

## 2019-10-01 ENCOUNTER — DOCUMENTATION ONLY (OUTPATIENT)
Dept: FAMILY MEDICINE CLINIC | Age: 34
End: 2019-10-01

## 2019-10-01 NOTE — TELEPHONE ENCOUNTER
Patient needs to return a minimum of once a year to continue to take the birth control pill treatment

## 2019-10-01 NOTE — PROGRESS NOTES
Patient had negative pap, next pap needed in 4 years due to negative HPV testing a year ago. I like to do an annual pelvic exam and I would also check a serum or urine pregnancy. Please advise her of the less crowded sites where she can follow up/make the line. Another option is to go to her local health department, have financial screening, and be seen there.   Richelle Malin, MUNA

## 2019-10-03 NOTE — TELEPHONE ENCOUNTER
Left a generic message for patient today asking the patient to call back to speak with a nurse for message from provider. Randee Bentley, RN    I also mailed a letter to pt with ALTON Rios asking her to return to see a doctor.  Randee Bentley, RN

## 2019-10-18 ENCOUNTER — TELEPHONE (OUTPATIENT)
Dept: FAMILY MEDICINE CLINIC | Age: 34
End: 2019-10-18

## 2019-10-18 NOTE — PROGRESS NOTES
Pt was called she was given the providers message regarding the Negative pap smear and annual pelvic is needed. Provider had also mentioned getting serum or urine pregnancy test. Pt has already had all of her exams she stated at the [facility] and will return annually to the [facility] for f/u.  Jose G Davies RN

## 2019-10-18 NOTE — TELEPHONE ENCOUNTER
Tc to the pt using Dariusz Green as the . The pt was given the providers result note message and recommendation's regarding her negative PAP smear another is not needed for 4 yrs. She however needs an annual pelvic exam is still needed and pregnancy serum or urine testing. The pt stated she has had it already at the [facility] and she plans on returning to the [facility] for all future GYN needs. Routed message to the provider.  Shady Jenkins RN

## 2022-03-19 PROBLEM — R63.5 WEIGHT GAIN: Status: ACTIVE | Noted: 2017-11-16

## 2022-03-19 PROBLEM — E28.2 PCOS (POLYCYSTIC OVARIAN SYNDROME): Status: ACTIVE | Noted: 2017-11-16

## 2022-03-19 PROBLEM — N63.20 LEFT BREAST MASS: Status: ACTIVE | Noted: 2017-04-27

## 2022-03-20 PROBLEM — E66.01 OBESITY, MORBID (HCC): Status: ACTIVE | Noted: 2018-01-12

## 2023-05-23 RX ORDER — METFORMIN HYDROCHLORIDE 500 MG/1
500 TABLET, EXTENDED RELEASE ORAL
COMMUNITY
Start: 2018-01-12

## 2023-05-23 RX ORDER — LEVONORGESTREL AND ETHINYL ESTRADIOL 0.1-0.02MG
1 KIT ORAL DAILY
COMMUNITY
Start: 2019-02-16